# Patient Record
Sex: FEMALE | Race: WHITE | HISPANIC OR LATINO | Employment: UNEMPLOYED | URBAN - METROPOLITAN AREA
[De-identification: names, ages, dates, MRNs, and addresses within clinical notes are randomized per-mention and may not be internally consistent; named-entity substitution may affect disease eponyms.]

---

## 2017-01-17 ENCOUNTER — HOSPITAL ENCOUNTER (OUTPATIENT)
Dept: RADIOLOGY | Facility: HOSPITAL | Age: 77
Discharge: HOME | End: 2017-01-17
Attending: NURSE PRACTITIONER
Payer: MEDICARE

## 2017-01-17 ENCOUNTER — OFFICE VISIT (OUTPATIENT)
Dept: UROGYNECOLOGY | Facility: CLINIC | Age: 77
End: 2017-01-17
Payer: MEDICARE

## 2017-01-17 VITALS
BODY MASS INDEX: 27.66 KG/M2 | HEIGHT: 60 IN | DIASTOLIC BLOOD PRESSURE: 76 MMHG | WEIGHT: 140.88 LBS | SYSTOLIC BLOOD PRESSURE: 140 MMHG

## 2017-01-17 DIAGNOSIS — Z46.89 PESSARY MAINTENANCE: ICD-10-CM

## 2017-01-17 DIAGNOSIS — R19.8 IRREGULAR BOWEL HABITS: ICD-10-CM

## 2017-01-17 DIAGNOSIS — R10.11 RUQ PAIN: ICD-10-CM

## 2017-01-17 DIAGNOSIS — N95.2 VAGINAL ATROPHY: ICD-10-CM

## 2017-01-17 DIAGNOSIS — K76.89 LIVER CYST: ICD-10-CM

## 2017-01-17 DIAGNOSIS — N81.11 MIDLINE CYSTOCELE: ICD-10-CM

## 2017-01-17 DIAGNOSIS — N81.6 RECTOCELE: ICD-10-CM

## 2017-01-17 DIAGNOSIS — N81.4 UTERINE PROLAPSE: Primary | ICD-10-CM

## 2017-01-17 DIAGNOSIS — N81.4 UTEROVAGINAL PROLAPSE: ICD-10-CM

## 2017-01-17 DIAGNOSIS — N39.41 URGE INCONTINENCE: ICD-10-CM

## 2017-01-17 PROCEDURE — 1125F AMNT PAIN NOTED PAIN PRSNT: CPT | Mod: S$GLB,,, | Performed by: NURSE PRACTITIONER

## 2017-01-17 PROCEDURE — 76856 US EXAM PELVIC COMPLETE: CPT | Mod: TC

## 2017-01-17 PROCEDURE — 76830 TRANSVAGINAL US NON-OB: CPT | Mod: 26,,, | Performed by: RADIOLOGY

## 2017-01-17 PROCEDURE — 99999 PR PBB SHADOW E&M-EST. PATIENT-LVL III: CPT | Mod: PBBFAC,,, | Performed by: NURSE PRACTITIONER

## 2017-01-17 PROCEDURE — 1159F MED LIST DOCD IN RCRD: CPT | Mod: S$GLB,,, | Performed by: NURSE PRACTITIONER

## 2017-01-17 PROCEDURE — 76856 US EXAM PELVIC COMPLETE: CPT | Mod: 26,,, | Performed by: RADIOLOGY

## 2017-01-17 PROCEDURE — 1160F RVW MEDS BY RX/DR IN RCRD: CPT | Mod: S$GLB,,, | Performed by: NURSE PRACTITIONER

## 2017-01-17 PROCEDURE — 1157F ADVNC CARE PLAN IN RCRD: CPT | Mod: S$GLB,,, | Performed by: NURSE PRACTITIONER

## 2017-01-17 PROCEDURE — 99213 OFFICE O/P EST LOW 20 MIN: CPT | Mod: S$GLB,,, | Performed by: NURSE PRACTITIONER

## 2017-01-17 NOTE — PATIENT INSTRUCTIONS
1)  Mixed urinary incontinence, urge > stress/prolapse:    --Empty bladder every 3 hours.  Empty well: wait a minute, lean forward on toilet.    --Avoid dietary irritants (see sheet).  Keep diary x 3-5 days to determine your irritants.  --KEGELS: do 10 in AM and 10 in PM, holding each x 10 seconds.  When you feel urge to go, STOP, KEGEL, and when urge has passed, then go to bathroom.  Consider PT in future.  --URGE: consider anticholinergic.   Takes 2-4 weeks to see if will have effect.  For dry mouth: get sour, sugar free lozenge or gum.    --STRESS:  Pessary vs. Sling.   --continue use #4 ring with support pessary      2)Vaginal atrophy (dryness):  Use 1 gram of estrogen cream in vagina twice a week--use small amount with your finger around the opening as well  --trimosan-- use daily    3)Constipation:  Controlling constipation may help bladder urgency/leakage and fiber may better control cholesterol and blood glucose.  Start daily fiber.  Take 1 tsp of fiber powder (psyllium or other sugar-free powder).  Mix in 8 oz of water.  Take x 3-5 days.  Then, increase fiber by 1 tsp every 3-5 days until stool is easy to pass.  Stop and continue at that dose.   Do not exceed 6 tsps/day.  May also use over the counter stool softener 1-2 x/day.  AVOID laxatives.    4)Continue to followup with GI    5)RUQ pain/ liver cyst  --will schedule with hepatology    6)RTC pending ultrasound results

## 2017-01-17 NOTE — MR AVS SNAPSHOT
[facility] - Gynecology  [address]  [facility] [address]  Phone: [phone]                  Linda Felton   2017 1:00 PM   Office Visit    Description:  Female : 1940   Provider:  Madisyn Ramires NP   Department:  [facility] - Gynecology           Reason for Visit     Pessary Check                To Do List           Goals (5 Years of Data)     None      [facility]     [facility] -  Assistance  Registered nurses in the [facility] provide clinical advisement, health education, appointment booking, and other advisory services.  Call for this free service at [phone].             Medications           Message regarding Medications     Verify the changes and/or additions to your medication regime listed below are the same as discussed with your clinician today.  If any of these changes or additions are incorrect, please notify your healthcare provider.             Verify that the below list of medications is an accurate representation of the medications you are currently taking.  If none reported, the list may be blank. If incorrect, please contact your healthcare provider. Carry this list with you in case of emergency.           Current Medications     aspirin-sod bicarb-citric acid (CHANO-SELTZER) 324 mg TbEF Take 325 mg by mouth once daily. Patient takes 1/4 tablet for upset stomach    atenolol (TENORMIN) 50 MG tablet Take by mouth. 1 Tablet Oral Every day    conjugated estrogens (PREMARIN) vaginal cream 1 g with applicator or dime-sized amt with finger in vagina nightly x 2 weeks, then twice a week thereafter    FLUZONE HIGH-DOSE -, PF, 180 mcg/0.5 mL Syrg     olmesartan-hydrochlorothiazide (BENICAR HCT) 40-25 mg per tablet Take 1 tablet by mouth once daily.           Clinical Reference Information           Vital Signs - Last Recorded  Most recent update: 2017  1:34 PM by Raya Orta MA    BP Ht Wt BMI       (!)  140/76 (BP Location: Right arm, Patient Position: Sitting, BP Method: Manual) 5' (1.524 m) 63.9 kg (140 lb 14 oz) 27.51 kg/m2       Blood Pressure          Most Recent Value    BP  (!)  140/76      Allergies as of 1/17/2017     Ivp Dye  [Iodinated Contrast Media - Iv Dye]    Penicillins    Sulfa (Sulfonamide Antibiotics)      Immunizations Administered on Date of Encounter - 1/17/2017     None      Instructions    1)  Mixed urinary incontinence, urge > stress/prolapse:    --Empty bladder every 3 hours.  Empty well: wait a minute, lean forward on toilet.    --Avoid dietary irritants (see sheet).  Keep diary x 3-5 days to determine your irritants.  --KEGELS: do 10 in AM and 10 in PM, holding each x 10 seconds.  When you feel urge to go, STOP, KEGEL, and when urge has passed, then go to bathroom.  Consider PT in future.  --URGE: consider anticholinergic.   Takes 2-4 weeks to see if will have effect.  For dry mouth: get sour, sugar free lozenge or gum.    --STRESS:  Pessary vs. Sling.   --continue use #4 ring with support pessary      2)Vaginal atrophy (dryness):  Use 1 gram of estrogen cream in vagina twice a week--use small amount with your finger around the opening as well  --trimosan-- use daily    3)Constipation:  Controlling constipation may help bladder urgency/leakage and fiber may better control cholesterol and blood glucose.  Start daily fiber.  Take 1 tsp of fiber powder (psyllium or other sugar-free powder).  Mix in 8 oz of water.  Take x 3-5 days.  Then, increase fiber by 1 tsp every 3-5 days until stool is easy to pass.  Stop and continue at that dose.   Do not exceed 6 tsps/day.  May also use over the counter stool softener 1-2 x/day.  AVOID laxatives.    4)Continue to followup with GI    5)RUQ pain/ liver cyst  --will schedule with hepatology    6)RTC pending ultrasound results

## 2017-01-17 NOTE — PROGRESS NOTES
Urogyn follow up  01/17/2017  .  [facility] - GYNECOLOGY  [address]  [place] [address]    Linda Felton  [id number]  1940      Linda Felton is a 77 y.o.  here for a urogyn follow up.    Last HPI from 12/06/2016    HPI:   1)  UI:  (--) ERIKA   (+) UUI    (+) pads:3/day, usually moderate wetness.  Daytime frequency: Q 3 hours.  Nocturia: NO.   (--) dysuria,  (--) hematuria,  (--) frequent UTIs.  (+) complete bladder emptying.   2)  POP:  Present. below introitus.  Symptoms:(--)    (--) vaginal bleeding. (+) vaginal discharge- scant white discharge (--) sexually active.  (--)  Vaginal dryness.  (+) vaginal estrogen use.   3)  BM:  (+) constipation/straining.  (--) chronic diarrhea. (--) hematochezia.  (--) fecal incontinence.  (--) fecal smearing/urgency.  (--) incomplete evacuation.  Taking benefiber 2 tsp twice daily.  4)pessary:  Denies pain or bleeding. Using #4 ring with support.  5)Patient had C. Diff and  + H. Pylori infections - followed by GI  Still has + abdominal pain and loose frequent stools.  Intermittent diarrhea alternating with constipation.    Changes from last visit:  1)  UI:  (--) ERIKA   (+) UUI    (+) pads:3/day, usually moderate wetness.  Daytime frequency: Q 3 hours.  Nocturia: NO.   (--) dysuria,  (--) hematuria,  (--) frequent UTIs.  (+) complete bladder emptying.     2)  POP:  Present. below introitus.  Symptoms:(--)    (--) vaginal bleeding. (+) vaginal discharge- scant white discharge (--) sexually active.  (--)  Vaginal dryness.  (+) vaginal estrogen use.     3)  BM:  (+) constipation/straining.  (--) chronic diarrhea. (--) hematochezia.  (--) fecal incontinence.  (--) fecal smearing/urgency.  (--) incomplete evacuation.  Taking benefiber 2 tsp twice daily.    4)pessary:  Denies pain or bleeding. Using #4 ring with support.    5)Patient had C. Diff and  + H. Pylori infections - followed by GI  Still has + abdominal pain and loose frequent stools.  Intermittent  diarrhea alternating with constipation.    Complains of intermittent RUQ pain.    Past Medical History   Diagnosis Date    Arthritis     Diverticulitis     Hypertension        Past Surgical History   Procedure Laterality Date    Cholecystectomy      Kidney stones removed      Colonoscopy N/A 10/5/2015     Procedure: COLONOSCOPY;  Surgeon: Perez White MD;  Location: [facility] ([address] [place] [address]);  Service: Endoscopy;  Laterality: N/A;  C diff negative per Dr White/see microbiology report dated 8/26/15/svn       Current Outpatient Prescriptions   Medication Sig    aspirin-sod bicarb-citric acid (CHANO-SELTZER) 324 mg TbEF Take 325 mg by mouth once daily. Patient takes 1/4 tablet for upset stomach    atenolol (TENORMIN) 50 MG tablet Take by mouth. 1 Tablet Oral Every day    FLUZONE HIGH-DOSE 2016-17, PF, 180 mcg/0.5 mL Syrg     olmesartan-hydrochlorothiazide (BENICAR HCT) 40-25 mg per tablet Take 1 tablet by mouth once daily.    conjugated estrogens (PREMARIN) vaginal cream Place 0.5 g vaginally twice a week.     No current facility-administered medications for this visit.        Well woman:  Pap:04/2012- normal  Mammo:patient no longer wants to screen  Colonoscopy:  10/2015 diverticulosis, h. Pylori  Dexa:does not want to screen  ROS:  As per HPI.      Exam  Visit Vitals    BP (!) 140/76 (BP Location: Right arm, Patient Position: Sitting, BP Method: Manual)    Ht 5' (1.524 m)    Wt 63.9 kg (140 lb 14 oz)    BMI 27.51 kg/m2     General: alert and oriented, no acute distress  Respiratory: normal respiratory effort  Abd: soft, non-tender, non-distended    Pelvic  Ext. Genitalia: normal external genitalia. Normal bartholin's and skeens glands  Vagina: + atrophy. Normal vaginal mucosa without lesions. No discharge noted.   Non-tender bladder base without palpable mass.  #4 ring with support in place.  Cervix: no lesions  Uterus:  uterus is normal size, shape, consistency and nontender   Urethra: no masses or  tenderness  Urethral meatus: no lesions, caruncle or prolapse.    POP-Q: deferred    Pessary was removed without difficulty.  Washed with soap and water.  Replaced without difficulty.    Impression    1 Irregular bowel habits   2 Rectocele    3 Prolapsed, uterovaginal, complete    4 Atrophic vaginitis    5 Cystocele    6     Urge incontiinence  7.   Pessary maintenance  8.   RUQ pain  9.   Liver cyst    We reviewed the above issues and discussed options for short-term versus long-term management of her problems.   Plan:   1)  Mixed urinary incontinence, urge > stress/prolapse:    --Empty bladder every 3 hours.  Empty well: wait a minute, lean forward on toilet.    --Avoid dietary irritants (see sheet).  Keep diary x 3-5 days to determine your irritants.  --KEGELS: do 10 in AM and 10 in PM, holding each x 10 seconds.  When you feel urge to go, STOP, KEGEL, and when urge has passed, then go to bathroom.  Consider PT in future.  --URGE: consider anticholinergic.   Takes 2-4 weeks to see if will have effect.  For dry mouth: get sour, sugar free lozenge or gum.    --STRESS:  Pessary vs. Sling.   --continue use #4 ring with support pessary      2)Vaginal atrophy (dryness):  Use 1 gram of estrogen cream in vagina twice a week--use small amount with your finger around the opening as well  --trimosan-- use daily    3)Constipation:  Controlling constipation may help bladder urgency/leakage and fiber may better control cholesterol and blood glucose.  Start daily fiber.  Take 1 tsp of fiber powder (psyllium or other sugar-free powder).  Mix in 8 oz of water.  Take x 3-5 days.  Then, increase fiber by 1 tsp every 3-5 days until stool is easy to pass.  Stop and continue at that dose.   Do not exceed 6 tsps/day.  May also use over the counter stool softener 1-2 x/day.  AVOID laxatives.    4)Continue to followup with GI    5)RUQ pain/ liver cyst  --will schedule with hepatology    6)RTC 3 months for pessary cleaning    30 minutes  were spent in face to face time with this patient  75 % of this time was spent in counseling and/or coordination of care    CHEVY Duval-BC  [facility]  Division of Female Pelvic Medicine and Reconstructive Surgery  Department of Obstetrics & Gynecology

## 2017-01-18 DIAGNOSIS — N95.2 ATROPHIC VAGINITIS: ICD-10-CM

## 2017-01-31 ENCOUNTER — DOCUMENTATION ONLY (OUTPATIENT)
Dept: TRANSPLANT | Facility: CLINIC | Age: 77
End: 2017-01-31

## 2017-01-31 ENCOUNTER — TELEPHONE (OUTPATIENT)
Dept: TRANSPLANT | Facility: CLINIC | Age: 77
End: 2017-01-31

## 2017-01-31 NOTE — LETTER
January 31, 2017    Madisyn Ramires NP  [address] [facility] [address] [place] [address]      Dear Dr. Ramires    Patient: Linda Felton   MR Number: [id number]   YOB: 1940     Thank you for the referral of Linda Felton to the [facility]. An initial appointment will be scheduled for your patient with one of our Hepatologists.      Thank you again for your trust in our program.  If there is anything we can do for you or your staff, please feel free to contact us.        Sincerely,        [facility]  [address] [place] [id number]  [phone]

## 2017-01-31 NOTE — LETTER
January 31, 2017    Mrs. Linda Felton  [address] [address]      Dear Mrs. Linda Felton:    Your doctor has referred you to the [facility]. You will be contacted by our office and an initial appointment will then be scheduled for you.    We look forward to seeing you soon. If you have any further questions, please contact us at [phone].       Sincerely,        [facility]   [address] [place] [address]  [phone]

## 2017-01-31 NOTE — TELEPHONE ENCOUNTER
----- Message from Rossi St Javed sent at 1/31/2017 10:41 AM CST -----  Est patient of Dr. Curtis and she wants to wait to see Dr. Curtis.  She does not want to see a NP.  ----- Message -----     From: Viky La     Sent: 1/31/2017   9:40 AM       To: Hepatology Scheduling    Pt records reviewed.   Pt will be referred to Hepatology.    [facility]   Referring Provider/diagnosis  Madisyn Ramires NP   Diagnosis: Liver cyst  RUQ pain    PT was seen in 2016 for same cyst, being referred again.    Referral letter sent to provider and patient.

## 2017-01-31 NOTE — NURSING
Pt records reviewed.   Pt will be referred to Hepatology.    Initial referral received  from the [facility].   Referring Provider/diagnosis  Madisyn Ramires NP    Diagnosis: Liver cyst  RUQ pain     PT was seen in 2016 for same cyst, being referred again.    Referral letter sent to provider and patient.

## 2017-02-03 ENCOUNTER — TELEPHONE (OUTPATIENT)
Dept: HEPATOLOGY | Facility: CLINIC | Age: 77
End: 2017-02-03

## 2017-02-03 NOTE — TELEPHONE ENCOUNTER
----- Message from Roselia Singer sent at 2/2/2017 12:59 PM CST -----  Contact: Self  Good afternoon,     Pt is requesting an appt due to check up.    Pt can be reached at [phone].    Thank you!

## 2017-02-16 ENCOUNTER — DOCUMENTATION ONLY (OUTPATIENT)
Dept: TRANSPLANT | Facility: CLINIC | Age: 77
End: 2017-02-16

## 2017-02-16 DIAGNOSIS — R93.89 THICKENED ENDOMETRIUM: Primary | ICD-10-CM

## 2017-02-20 DIAGNOSIS — R93.89 THICKENED ENDOMETRIUM: Primary | ICD-10-CM

## 2017-02-21 ENCOUNTER — OFFICE VISIT (OUTPATIENT)
Dept: HEPATOLOGY | Facility: CLINIC | Age: 77
End: 2017-02-21
Payer: MEDICARE

## 2017-02-21 ENCOUNTER — TELEPHONE (OUTPATIENT)
Dept: UROGYNECOLOGY | Facility: CLINIC | Age: 77
End: 2017-02-21

## 2017-02-21 VITALS
OXYGEN SATURATION: 98 % | WEIGHT: 139.56 LBS | HEIGHT: 64 IN | SYSTOLIC BLOOD PRESSURE: 146 MMHG | HEART RATE: 62 BPM | BODY MASS INDEX: 23.82 KG/M2 | TEMPERATURE: 98 F | DIASTOLIC BLOOD PRESSURE: 61 MMHG | RESPIRATION RATE: 20 BRPM

## 2017-02-21 DIAGNOSIS — K76.89 LIVER CYST: Primary | ICD-10-CM

## 2017-02-21 PROCEDURE — 99999 PR PBB SHADOW E&M-EST. PATIENT-LVL III: CPT | Mod: PBBFAC,,, | Performed by: INTERNAL MEDICINE

## 2017-02-21 PROCEDURE — 1157F ADVNC CARE PLAN IN RCRD: CPT | Mod: S$GLB,,, | Performed by: INTERNAL MEDICINE

## 2017-02-21 PROCEDURE — 1125F AMNT PAIN NOTED PAIN PRSNT: CPT | Mod: S$GLB,,, | Performed by: INTERNAL MEDICINE

## 2017-02-21 PROCEDURE — 99213 OFFICE O/P EST LOW 20 MIN: CPT | Mod: S$GLB,,, | Performed by: INTERNAL MEDICINE

## 2017-02-21 PROCEDURE — 1160F RVW MEDS BY RX/DR IN RCRD: CPT | Mod: S$GLB,,, | Performed by: INTERNAL MEDICINE

## 2017-02-21 PROCEDURE — 1159F MED LIST DOCD IN RCRD: CPT | Mod: S$GLB,,, | Performed by: INTERNAL MEDICINE

## 2017-02-21 RX ORDER — HYDROCHLOROTHIAZIDE 25 MG/1
25 TABLET ORAL DAILY
Refills: 0 | COMMUNITY
Start: 2017-01-18

## 2017-02-21 RX ORDER — AMLODIPINE BESYLATE 10 MG/1
10 TABLET ORAL DAILY
Refills: 0 | COMMUNITY
Start: 2017-01-18 | End: 2017-09-07 | Stop reason: DRUGHIGH

## 2017-02-21 NOTE — PROGRESS NOTES
[facility] Follow-up Note    Reason for Consult:  The encounter diagnosis was Liver cyst.    PCP: Bhargav Fernandez       HPI:  This is a 77 y.o. female here for evaluation of: liver cyst    Liver cyst seen incidentally on CT renal stone study on 7/6/12 (3.5 yrs ago).  Ultrasound of abd 4/2016 showed a larger cyst 9.4 x 7.2 x 8.1 cm is consistent with a simple cyst.    Noncontrast CT renal stone study from 2012:  The noncontrast appearance of the liver demonstrates a low attenuating lesion at the tip of the right lobe measuring 5.4 x 6 .0 x 6.9 cm, could represent a liver cyst, this could be confirmed with ultrasound.      Ultrasound of abd 4/2016 showed a larger cyst 9.4 x 7.2 x 8.1 cm is consistent with a simple cyst.    Occasional pain over the RUQ, wears a support belt across the upper abdomen while using treadmill during exercise.  Dr. Fernandez, PCP, did another u/s in his office in [place], and it was the same size as previous u/s study. No bleeding in the cyst.          ROS:  Constitutional: No fevers, chills, weight changes, fatigue  ENT: No allergies, nosebleeds,   CV: No chest pain  Pulm: No cough, shortness of breath  Ophtho: No vision changes  GI/Liver: see HPI, has reflux (acid), burning retrosternal pain.    Derm: No rash, itching  Heme: No swollen glands, bruising  MSK: No joint pains, joint swelling  : No dysuria, hematuria, decrease in urine output  Endo: No hot or cold intolerance  Neuro: No confusion, disorientation, difficulty with sleep, memory, concentration, syncope, seizure  Psych: No anxiety, depression    Medical History:  has a past medical history of Arthritis; Diverticulitis; and Hypertension.    Surgical History:  has a past surgical history that includes Cholecystectomy; kidney stones removed; and Colonoscopy (N/A, 10/5/2015).    Family History: family history includes Breast cancer in her maternal aunt; Colon cancer (age of onset: 60) in her sister; Hypertension in  "her mother; Seizures in her paternal uncle. There is no history of Ovarian cancer, Endometrial cancer, Vaginal cancer, Cervical cancer, Celiac disease, Crohn's disease, Esophageal cancer, Inflammatory bowel disease, Liver cancer, Liver disease, Rectal cancer, Stomach cancer, Ulcerative colitis, or Cirrhosis..     Social History:  reports that she has never smoked. She has never used smokeless tobacco. She reports that she does not drink alcohol or use illicit drugs.    Review of patient's allergies indicates:   Allergen Reactions    Ivp dye  [iodinated contrast media - iv dye]      Other reaction(s): Rash    Penicillins      Other reaction(s): Rash    Sulfa (sulfonamide antibiotics)      Other reaction(s): Rash       Current Outpatient Rx   Name  Route  Sig  Dispense  Refill    atenolol (TENORMIN) 50 MG tablet    Oral    Take by mouth. 1 Tablet Oral Every day              conjugated estrogens (PREMARIN) vaginal cream        1 g with applicator or dime-sized amt with finger in vagina nightly x 2 weeks, then twice a week thereafter    30 g    12      olmesartan-hydrochlorothiazide (BENICAR HCT) 40-25 mg per tablet    Oral    Take 1 tablet by mouth once daily.              DISCONTD: esomeprazole (NEXIUM) 40 MG capsule    Oral    Take 1 capsule (40 mg total) by mouth before breakfast.    90 capsule    3      DISCONTD: hydrochlorothiazide (HYDRODIURIL) 25 MG tablet    Oral    Take 25 mg by mouth once daily.        0         Objective Findings:    Vital Signs:  Visit Vitals    BP (!) 146/61 (BP Location: Right arm, Patient Position: Sitting, BP Method: Automatic)    Pulse 62    Temp 97.8 °F (36.6 °C) (Oral)    Resp 20    Ht 5' 4" (1.626 m)    Wt 63.3 kg (139 lb 8.8 oz)    SpO2 98%    BMI 23.95 kg/m2     Body mass index is 23.95 kg/(m^2).    Physical Exam:  General Appearance: Well appearing in no acute distress  Head:   Normocephalic, without obvious abnormality  Eyes:    No scleral icterus, EOMI  ENT: " Neck supple, Lips, mucosa, and tongue normal; teeth and gums normal  Lungs: CTA bilaterally in anterior and posterior fields, no wheezes, no crackles.  Heart:  Regular rate and rhythm, S1, S2 normal, no murmurs heard  Abdomen: Soft, non tender, non distended with positive bowel sounds in all four quadrants. No hepatosplenomegaly, ascites, or mass  Extremities: 2+ pulses, no clubbing, cyanosis or edema  Skin: No rash  Neurologic: CN II-XII intact, alert, oriented x 3. No asterixis.      Labs:  Lab Results   Component Value Date    WBC 5.25 04/10/2015    HGB 13.3 04/10/2015    HCT 39.8 04/10/2015     04/10/2015    CREATININE 1.1 07/14/2015    BUN 23 07/14/2015    BILITOT 0.9 05/05/2015    ALT 12 05/05/2015    AST 17 05/05/2015    ALKPHOS 74 05/05/2015     07/14/2015    K 3.3 (L) 07/14/2015     07/14/2015    CO2 29 07/14/2015    TSH 0.760 04/10/2015       Imaging:   As above. Old noncon CT  U/s 4/2016    Endoscopy:    EGD, colonoscopy    Assessment:  1. Liver cyst       Incidental findings on non-contrast CT renal stone study showed:  low attenuating lesion at the tip of the right lobe measuring 5.4 x 6 .0 x 6.9 cm, could represent a liver cyst, this was confirmed with ultrasound.   Follow-up ultrasound 4/13/16: The cystic mass measures 9.4 x 7.2 x 8.1 cm is consistent with a simple cyst.    -  GERD symptoms, patient not taking PPI because she saw on TV it is bad for you.      Recommendations:  -  Ultrasound abd to evaluate liver cyst in 1 year.   -  Return in 1 year.     Return if symptoms worsen or fail to improve.      Order summary:  Orders Placed This Encounter   Procedures    US Abdomen Limited       Thank you so much for allowing me to participate in the care of Linda Curtis MD

## 2017-02-21 NOTE — TELEPHONE ENCOUNTER
----- Message from Madisyn Ramires NP sent at 2/20/2017  4:37 PM CST -----  Please schedule patient for endometrial biopsy.  I have placed the orders.      Thanks,  Madisyn

## 2017-02-21 NOTE — TELEPHONE ENCOUNTER
Spoke to pt and scheduled her EMBX on 3/14 and informed the pt i'd send an appointment letter in the mail. Pt voiced understanding and call ended.

## 2017-02-22 ENCOUNTER — TELEPHONE (OUTPATIENT)
Dept: HEPATOLOGY | Facility: CLINIC | Age: 77
End: 2017-02-22

## 2017-03-14 ENCOUNTER — PROCEDURE VISIT (OUTPATIENT)
Dept: UROGYNECOLOGY | Facility: CLINIC | Age: 77
End: 2017-03-14
Payer: MEDICARE

## 2017-03-14 VITALS — SYSTOLIC BLOOD PRESSURE: 122 MMHG | DIASTOLIC BLOOD PRESSURE: 70 MMHG | HEIGHT: 63 IN

## 2017-03-14 DIAGNOSIS — R93.89 THICKENED ENDOMETRIUM: Primary | ICD-10-CM

## 2017-03-14 PROCEDURE — 88305 TISSUE EXAM BY PATHOLOGIST: CPT | Mod: 26,,,

## 2017-03-14 PROCEDURE — 88305 TISSUE EXAM BY PATHOLOGIST: CPT

## 2017-03-14 PROCEDURE — 58100 BIOPSY OF UTERUS LINING: CPT | Mod: S$GLB,,, | Performed by: NURSE PRACTITIONER

## 2017-03-14 NOTE — MR AVS SNAPSHOT
[facility] - Gynecology  [address]  [facility] [address]  Phone: [phone]                  Linda Felton   3/14/2017 1:30 PM   Procedure visit    Description:  Female : 1940   Provider:  Madisyn Ramires NP   Department:  [facility] - Gynecology           Reason for Visit     Endometrial Biopsy           Diagnoses this Visit        Comments    Thickened endometrium    -  Primary            To Do List           Goals (5 Years of Data)     None      [facility]     [facility] Care [facility] -  Assistance  Registered nurses in the [facility] provide clinical advisement, health education, appointment booking, and other advisory services.  Call for this free service at [phone].             Medications           Message regarding Medications     Verify the changes and/or additions to your medication regime listed below are the same as discussed with your clinician today.  If any of these changes or additions are incorrect, please notify your healthcare provider.        STOP taking these medications     FLUZONE HIGH-DOSE , PF, 180 mcg/0.5 mL Syrg            Verify that the below list of medications is an accurate representation of the medications you are currently taking.  If none reported, the list may be blank. If incorrect, please contact your healthcare provider. Carry this list with you in case of emergency.           Current Medications     amlodipine (NORVASC) 10 MG tablet Take 10 mg by mouth once daily.    aspirin-sod bicarb-citric acid (CHANO-SELTZER) 324 mg TbEF Take 325 mg by mouth once daily. Patient takes 1/4 tablet for upset stomach    atenolol (TENORMIN) 50 MG tablet Take by mouth. 1 Tablet Oral Every day    conjugated estrogens (PREMARIN) vaginal cream Place 0.5 g vaginally twice a week.    hydrochlorothiazide (HYDRODIURIL) 25 MG tablet Take 25 mg by mouth once daily.           Clinical Reference Information           Your Vitals  "Were     BP Height                122/70 5' 3" (1.6 m)          Blood Pressure          Most Recent Value    BP  122/70      Allergies as of 3/14/2017     Ivp Dye  [Iodinated Contrast Media - Iv Dye]    Penicillins    Sulfa (Sulfonamide Antibiotics)      Immunizations Administered on Date of Encounter - 3/14/2017     None      Orders Placed During Today's Visit      Normal Orders This Visit    Biopsy (Gynecological)       Instructions    1) Mixed urinary incontinence, urge > stress/prolapse:   --Empty bladder every 3 hours. Empty well: wait a minute, lean forward on toilet.   --Avoid dietary irritants (see sheet). Keep diary x 3-5 days to determine your irritants.  --KEGELS: do 10 in AM and 10 in PM, holding each x 10 seconds. When you feel urge to go, STOP, KEGEL, and when urge has passed, then go to bathroom. Consider PT in future. --URGE: consider anticholinergic. Takes 2-4 weeks to see if will have effect. For dry mouth: get sour, sugar free lozenge or gum.   --STRESS: Pessary vs. Sling.   --continue use #4 ring with support pessary        2)Vaginal atrophy (dryness): Use 1 gram of estrogen cream in vagina twice a week--use small amount with your finger around the opening as well  --trimosan-- use daily     3)Constipation:  Controlling constipation may help bladder urgency/leakage and fiber may better control cholesterol and blood glucose. Start daily fiber. Take 1 tsp of fiber powder (psyllium or other sugar-free powder). Mix in 8 oz of water. Take x 3-5 days. Then, increase fiber by 1 tsp every 3-5 days until stool is easy to pass. Stop and continue at that dose. Do not exceed 6 tsps/day. May also use over the counter stool softener 1-2 x/day. AVOID laxatives.     4)Continue to followup with GI     5)RUQ pain/ liver cyst  --will schedule with hepatology    6)thickened endometrium  --embx done today  --takes 2 weeks for results.     7)RTC 3 months for pessary cleaning or sooner for surgery       Language " Assistance Services     ATTENTION: Language assistance services are available, free of charge. Please call [phone].      ATENCIÓN: Si habla louañol, tiene a boston disposición servicios gratuitos de asistencia lingüística. Llame al [phone].     CHÚ Ý: N?u b?n nói Ti?ng Vi?t, có các d?ch v? h? tr? ngôn ng? mi?n phí dành cho b?n. G?i s? [phone].         [facility] complies with applicable Federal civil rights laws and does not discriminate on the basis of race, color, national origin, age, disability, or sex.

## 2017-03-14 NOTE — PATIENT INSTRUCTIONS
1) Mixed urinary incontinence, urge > stress/prolapse:   --Empty bladder every 3 hours. Empty well: wait a minute, lean forward on toilet.   --Avoid dietary irritants (see sheet). Keep diary x 3-5 days to determine your irritants.  --KEGELS: do 10 in AM and 10 in PM, holding each x 10 seconds. When you feel urge to go, STOP, KEGEL, and when urge has passed, then go to bathroom. Consider PT in future. --URGE: consider anticholinergic. Takes 2-4 weeks to see if will have effect. For dry mouth: get sour, sugar free lozenge or gum.   --STRESS: Pessary vs. Sling.   --continue use #4 ring with support pessary        2)Vaginal atrophy (dryness): Use 1 gram of estrogen cream in vagina twice a week--use small amount with your finger around the opening as well  --trimosan-- use daily     3)Constipation:  Controlling constipation may help bladder urgency/leakage and fiber may better control cholesterol and blood glucose. Start daily fiber. Take 1 tsp of fiber powder (psyllium or other sugar-free powder). Mix in 8 oz of water. Take x 3-5 days. Then, increase fiber by 1 tsp every 3-5 days until stool is easy to pass. Stop and continue at that dose. Do not exceed 6 tsps/day. May also use over the counter stool softener 1-2 x/day. AVOID laxatives.     4)Continue to followup with GI     5)RUQ pain/ liver cyst  --will schedule with hepatology    6)thickened endometrium  --embx done today  --takes 2 weeks for results.     7)RTC 3 months for pessary cleaning or sooner for surgery

## 2017-03-14 NOTE — PROCEDURES
Biopsy (Gynecological)  Date/Time: 3/14/2017 2:12 PM  Performed by: ORLY OCAMPO  Authorized by: ORLY OCAMPO     Consent Done?:  Yes (Verbal)  Local anesthesia used?: No      Biopsy Location:  Uterus  Estimated blood loss (cc):  0   Patient tolerated the procedure well with no immediate complications.     PROCEDURE (EMBx):  The patient was placed in dorsal lithotomy position. The pessary was removed.  Washed with soap and water.  A sterile speculum was used to identify the cervix. The ectocervix was prepped x 2 with betadine.  A single tooth tenaculum was used to grasp the posterior cervix.  The endometrial biopsy pipelle was advanced into the uterine cavity until gentle resistance was met.  2 passes were made, and the specimen was submitted to pathology for further evaluation.  The tenaculum was removed, and the site remained hemostatic.  The speculum was removed.  The patient tolerated the procedure well. Pessary was reinserted without difficulty.  KVNG Duval

## 2017-03-17 ENCOUNTER — PATIENT MESSAGE (OUTPATIENT)
Dept: UROGYNECOLOGY | Facility: CLINIC | Age: 77
End: 2017-03-17

## 2017-03-22 DIAGNOSIS — N95.2 ATROPHIC VAGINITIS: ICD-10-CM

## 2017-03-23 RX ORDER — CONJUGATED ESTROGENS 0.62 MG/G
CREAM VAGINAL
Qty: 30 G | Refills: 3 | Status: SHIPPED | OUTPATIENT
Start: 2017-03-23 | End: 2018-07-05 | Stop reason: SDUPTHER

## 2017-07-19 ENCOUNTER — TELEPHONE (OUTPATIENT)
Dept: GASTROENTEROLOGY | Facility: CLINIC | Age: 77
End: 2017-07-19

## 2017-07-19 NOTE — TELEPHONE ENCOUNTER
----- Message from Qiana Marge sent at 7/19/2017 10:49 AM CDT -----  Contact: self [phone]  rosa - diarrhea and cramps - is asking for appt with dr lee - please call patient at [phone]

## 2017-08-01 ENCOUNTER — OFFICE VISIT (OUTPATIENT)
Dept: UROGYNECOLOGY | Facility: CLINIC | Age: 77
End: 2017-08-01
Payer: MEDICARE

## 2017-08-01 VITALS — HEIGHT: 61 IN | BODY MASS INDEX: 24.64 KG/M2 | WEIGHT: 130.5 LBS

## 2017-08-01 DIAGNOSIS — N81.6 RECTOCELE: ICD-10-CM

## 2017-08-01 DIAGNOSIS — N81.11 MIDLINE CYSTOCELE: ICD-10-CM

## 2017-08-01 DIAGNOSIS — N99.3 VAGINAL VAULT PROLAPSE AFTER HYSTERECTOMY: Primary | ICD-10-CM

## 2017-08-01 DIAGNOSIS — Z46.89 PESSARY MAINTENANCE: ICD-10-CM

## 2017-08-01 DIAGNOSIS — N39.46 MIXED STRESS AND URGE URINARY INCONTINENCE: ICD-10-CM

## 2017-08-01 DIAGNOSIS — N95.2 VAGINAL ATROPHY: ICD-10-CM

## 2017-08-01 LAB
BILIRUB SERPL-MCNC: NORMAL MG/DL
BLOOD URINE, POC: NORMAL
COLOR, POC UA: YELLOW
GLUCOSE UR QL STRIP: NORMAL
KETONES UR QL STRIP: NORMAL
LEUKOCYTE ESTERASE URINE, POC: NORMAL
NITRITE, POC UA: NORMAL
PH, POC UA: 5
PROTEIN, POC: NORMAL
SPECIFIC GRAVITY, POC UA: 1.01
UROBILINOGEN, POC UA: NORMAL

## 2017-08-01 PROCEDURE — 99499 UNLISTED E&M SERVICE: CPT | Mod: S$GLB,,, | Performed by: NURSE PRACTITIONER

## 2017-08-01 PROCEDURE — 81002 URINALYSIS NONAUTO W/O SCOPE: CPT | Mod: S$GLB,,, | Performed by: NURSE PRACTITIONER

## 2017-08-01 PROCEDURE — 99999 PR PBB SHADOW E&M-EST. PATIENT-LVL III: CPT | Mod: PBBFAC,,, | Performed by: NURSE PRACTITIONER

## 2017-08-01 RX ORDER — AMLODIPINE BESYLATE 2.5 MG/1
2.5 TABLET ORAL DAILY
Refills: 0 | COMMUNITY
Start: 2017-06-28 | End: 2020-10-06

## 2017-08-01 RX ORDER — PANCRELIPASE 24000; 76000; 120000 [USP'U]/1; [USP'U]/1; [USP'U]/1
CAPSULE, DELAYED RELEASE PELLETS ORAL
COMMUNITY
Start: 2017-07-26 | End: 2018-11-27

## 2017-08-01 NOTE — PROGRESS NOTES
Urogyn follow up  08/01/2017  .  [facility] - GYNECOLOGY  [address]  [place] [address]    Linda Felton  [id number]  1940      Linda Felton is a 77 y.o.  here for a urogyn follow up.    Last HPI from 01/17/2017    HPI:   1)  UI:  (--) ERIKA   (+) UUI    (+) pads:3/day, usually moderate wetness.  Daytime frequency: Q 3 hours.  Nocturia: NO.   (--) dysuria,  (--) hematuria,  (--) frequent UTIs.  (+) complete bladder emptying.   2)  POP:  Present. below introitus.  Symptoms:(--)    (--) vaginal bleeding. (+) vaginal discharge- scant white discharge (--) sexually active.  (--)  Vaginal dryness.  (+) vaginal estrogen use.   3)  BM:  (+) constipation/straining.  (--) chronic diarrhea. (--) hematochezia.  (--) fecal incontinence.  (--) fecal smearing/urgency.  (--) incomplete evacuation.  Taking benefiber 2 tsp twice daily.  4)pessary:  Denies pain or bleeding. Using #4 ring with support.  5)Patient had C. Diff and  + H. Pylori infections - followed by GI  Still has + abdominal pain and loose frequent stools.  Intermittent diarrhea alternating with constipation.  Complains of intermittent RUQ pain.    Changes from last visit:  1)  UI:  (--) ERIKA   (+) UUI    (+) pads:3/day, usually moderate wetness.  Daytime frequency: Q 3 hours.  Nocturia: NO.   (--) dysuria,  (--) hematuria,  (--) frequent UTIs.  (+) complete bladder emptying.     2)  POP:  Present. below introitus.  Symptoms:(--)    (--) vaginal bleeding. (+) vaginal discharge- scant white discharge (--) sexually active.  (--)  Vaginal dryness.  (+) vaginal estrogen use.     3)  BM:  (+) constipation/straining.  (--) chronic diarrhea. (--) hematochezia.  (--) fecal incontinence.  (--) fecal smearing/urgency.  (--) incomplete evacuation.  Taking benefiber 2 tsp twice daily.    4)pessary:  Denies pain or bleeding. Using #4 ring with support.    5)Patient had C. Diff and  + H. Pylori infections - followed by GI  Still has + abdominal pain and  loose frequent stools.  Intermittent diarrhea alternating with constipation.  Will start creon today.    12/2016  The uterus measures 7.1 x 3.3 x 3.8 cm. Uterine parenchyma is homogeneous with evidence of a few uterine fibroids.  One appears partially calcified and measures 0.7 x 0.6 x 0.3 cm.  The largest is in the myometrium of the uterine fundus measuring 1.6 x 1.6 x 1.6 cm.  Additionally there is a new fibroid anteriorly measuring 0.9 x 0.7 x 0.9 cm. The endometrial stripe is thickened and measures 0.7 cm.    The right ovary is normal in size and measures 1.1 1.0 x 1.2 cm. The left ovary is normal in size and measures 1.1 x 0.4 x 1.2 cm. Follicles are seen in both ovaries. Arterial and venous flow are preserved bilaterally with resistive indices of 0.7 on the right and 0.75 on the left. No free fluid is seen.   Impression     The uterus demonstrates presence of a few fibroids, 2 of which are stable in appearance, and 1 new fibroid.  There is endometrial thickening, underlying pathology cannot be excluded.  Consider further evaluation.  This report has been flagged in the [facility] medical record.     03/14/2017  embx  ATROPHIC ENDOMETRIUM.  NO HYPERPLASIA OR MALIGNANCY IDENTIFIED    Past Medical History:   Diagnosis Date    Arthritis     Diverticulitis     Hypertension        Past Surgical History:   Procedure Laterality Date    CHOLECYSTECTOMY      COLONOSCOPY N/A 10/5/2015    Procedure: COLONOSCOPY;  Surgeon: Perez White MD;  Location: [address]);  Service: Endoscopy;  Laterality: N/A;  C diff negative per Dr White/see microbiology report dated 8/26/15/svn    kidney stones removed         Current Outpatient Prescriptions   Medication Sig    amlodipine (NORVASC) 10 MG tablet Take 10 mg by mouth once daily.    amlodipine (NORVASC) 2.5 MG tablet Take 2.5 mg by mouth once daily.    aspirin-sod bicarb-citric acid (CHANO-SELTZER) 324 mg TbEF Take 325 mg by mouth once daily. Patient takes 1/4  "tablet for upset stomach    atenolol (TENORMIN) 50 MG tablet Take by mouth. 1 Tablet Oral Every day    conjugated estrogens (PREMARIN) vaginal cream Place 0.5 g vaginally twice a week.    CREON 24,000-76,000 -120,000 unit capsule     hydrochlorothiazide (HYDRODIURIL) 25 MG tablet Take 25 mg by mouth once daily.    PREMARIN vaginal cream INSERT 1 APPLICATORFUL/DIME-SIZED AMOUNT WITH FINGER INTO VAGINA NIGHTLY X2 WEEKS,THEN TWICE WEEKLY     No current facility-administered medications for this visit.        Well woman:  Pap:04/2012- normal  Mammo:patient no longer wants to screen  Colonoscopy:  10/2015 diverticulosis, h. Pylori  Dexa:does not want to screen  ROS:  As per HPI.      Exam  Ht 5' 1" (1.549 m)   Wt 59.2 kg (130 lb 8.2 oz)   BMI 24.66 kg/m²   General: alert and oriented, no acute distress  Respiratory: normal respiratory effort  Abd: soft, non-tender, non-distended    Pelvic  Ext. Genitalia: normal external genitalia. Normal bartholin's and skeens glands  Vagina: + atrophy. Normal vaginal mucosa without lesions. No discharge noted.   Non-tender bladder base without palpable mass.  #4 ring with support in place.  Cervix: no lesions  Uterus:  uterus is normal size, shape, consistency and nontender   Urethra: no masses or tenderness  Urethral meatus: no lesions, caruncle or prolapse.    POP-Q: deferred    Pessary was removed without difficulty.  Washed with soap and water.  Replaced without difficulty.    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 Midline cystocele     3. Rectocele     4. Mixed stress and urge urinary incontinence  POCT URINE DIPSTICK WITHOUT MICROSCOPE   5. Vaginal atrophy     6. Pessary maintenance           Plan:   1)  Mixed urinary incontinence, urge > stress/prolapse:    --Empty bladder every 3 hours.  Empty well: wait a minute, lean forward on toilet.    --Avoid dietary irritants (see sheet).  Keep diary x 3-5 days to determine your irritants.  --KEGELS: do 10 in AM and " 10 in PM, holding each x 10 seconds.  When you feel urge to go, STOP, KEGEL, and when urge has passed, then go to bathroom.  Consider PT in future.  --URGE: consider anticholinergic.   Takes 2-4 weeks to see if will have effect.  For dry mouth: get sour, sugar free lozenge or gum.    --STRESS:  Pessary vs. Sling.   --continue use #4 ring with support pessary      2)Vaginal atrophy (dryness):  Use 1 gram of estrogen cream in vagina twice a week--use small amount with your finger around the opening as well  --trimosan-- use daily    3)Constipation:  Controlling constipation may help bladder urgency/leakage and fiber may better control cholesterol and blood glucose.  Start daily fiber.  Take 1 tsp of fiber powder (psyllium or other sugar-free powder).  Mix in 8 oz of water.  Take x 3-5 days.  Then, increase fiber by 1 tsp every 3-5 days until stool is easy to pass.  Stop and continue at that dose.   Do not exceed 6 tsps/day.  May also use over the counter stool softener 1-2 x/day.  AVOID laxatives.    4)Continue to followup with GI    5)RUQ pain/ liver cyst  --followed by hepatology    6)RTC to discuss surgery with Dr. Gates      30 minutes were spent in face to face time with this patient  75 % of this time was spent in counseling and/or coordination of care    CHEVY Duval-BC [facility]  Division of Female Pelvic Medicine and Reconstructive Surgery  Department of Obstetrics & Gynecology

## 2017-08-01 NOTE — PATIENT INSTRUCTIONS
1)  Mixed urinary incontinence, urge > stress/prolapse:    --Empty bladder every 3 hours.  Empty well: wait a minute, lean forward on toilet.    --Avoid dietary irritants (see sheet).  Keep diary x 3-5 days to determine your irritants.  --KEGELS: do 10 in AM and 10 in PM, holding each x 10 seconds.  When you feel urge to go, STOP, KEGEL, and when urge has passed, then go to bathroom.  Consider PT in future.  --URGE: consider anticholinergic.   Takes 2-4 weeks to see if will have effect.  For dry mouth: get sour, sugar free lozenge or gum.    --STRESS:  Pessary vs. Sling.   --continue use #4 ring with support pessary      2)Vaginal atrophy (dryness):  Use 1 gram of estrogen cream in vagina twice a week--use small amount with your finger around the opening as well  --trimosan-- use daily    3)Constipation:  Controlling constipation may help bladder urgency/leakage and fiber may better control cholesterol and blood glucose.  Start daily fiber.  Take 1 tsp of fiber powder (psyllium or other sugar-free powder).  Mix in 8 oz of water.  Take x 3-5 days.  Then, increase fiber by 1 tsp every 3-5 days until stool is easy to pass.  Stop and continue at that dose.   Do not exceed 6 tsps/day.  May also use over the counter stool softener 1-2 x/day.  AVOID laxatives.    4)Continue to followup with GI    5)RUQ pain/ liver cyst  --followed by hepatology    6)RTC to discuss surgery with Dr. Gates

## 2017-08-08 ENCOUNTER — OFFICE VISIT (OUTPATIENT)
Dept: UROGYNECOLOGY | Facility: CLINIC | Age: 77
End: 2017-08-08
Payer: MEDICARE

## 2017-08-08 VITALS
BODY MASS INDEX: 23.93 KG/M2 | SYSTOLIC BLOOD PRESSURE: 132 MMHG | HEIGHT: 62 IN | DIASTOLIC BLOOD PRESSURE: 84 MMHG | WEIGHT: 130.06 LBS

## 2017-08-08 DIAGNOSIS — N81.3 PROLAPSED, UTEROVAGINAL, COMPLETE: ICD-10-CM

## 2017-08-08 DIAGNOSIS — N39.46 URINARY INCONTINENCE, MIXED: Primary | ICD-10-CM

## 2017-08-08 DIAGNOSIS — R39.11 URINARY HESITANCY: ICD-10-CM

## 2017-08-08 DIAGNOSIS — K76.89 LIVER CYST: ICD-10-CM

## 2017-08-08 DIAGNOSIS — R19.7 DIARRHEA, UNSPECIFIED TYPE: ICD-10-CM

## 2017-08-08 DIAGNOSIS — N81.6 RECTOCELE: ICD-10-CM

## 2017-08-08 DIAGNOSIS — K59.09 CHRONIC CONSTIPATION: ICD-10-CM

## 2017-08-08 DIAGNOSIS — R39.15 URINARY URGENCY: ICD-10-CM

## 2017-08-08 DIAGNOSIS — N81.11 MIDLINE CYSTOCELE: ICD-10-CM

## 2017-08-08 DIAGNOSIS — N95.2 ATROPHIC VAGINITIS: ICD-10-CM

## 2017-08-08 DIAGNOSIS — N39.0 FREQUENT UTI: ICD-10-CM

## 2017-08-08 DIAGNOSIS — R19.5 LOOSE STOOLS: ICD-10-CM

## 2017-08-08 DIAGNOSIS — N36.41 URETHRAL HYPERMOBILITY: ICD-10-CM

## 2017-08-08 PROCEDURE — 99213 OFFICE O/P EST LOW 20 MIN: CPT | Mod: S$GLB,,, | Performed by: OBSTETRICS & GYNECOLOGY

## 2017-08-08 PROCEDURE — 99999 PR PBB SHADOW E&M-EST. PATIENT-LVL III: CPT | Mod: PBBFAC,,, | Performed by: OBSTETRICS & GYNECOLOGY

## 2017-08-08 PROCEDURE — 3008F BODY MASS INDEX DOCD: CPT | Mod: S$GLB,,, | Performed by: OBSTETRICS & GYNECOLOGY

## 2017-08-08 PROCEDURE — 1126F AMNT PAIN NOTED NONE PRSNT: CPT | Mod: S$GLB,,, | Performed by: OBSTETRICS & GYNECOLOGY

## 2017-08-08 PROCEDURE — 1159F MED LIST DOCD IN RCRD: CPT | Mod: S$GLB,,, | Performed by: OBSTETRICS & GYNECOLOGY

## 2017-08-08 NOTE — PROGRESS NOTES
Urogyn follow up  08/01/2017    [facility] - GYNECOLOGY  [address]  [facility] [id number]    Linda Felton  [id number]  1940      Linda Felton is a 77 y.o.  here for a urogyn follow up.    Last HPI from 01/17/2017    HPI:   1)  UI:  (--) ERIKA   (+) UUI    (+) pads:3/day, usually moderate wetness.  Daytime frequency: Q 3 hours.  Nocturia: NO.   (--) dysuria,  (--) hematuria,  (--) frequent UTIs.  (+) complete bladder emptying.   2)  POP:  Present. below introitus.  Symptoms:(--)    (--) vaginal bleeding. (+) vaginal discharge- scant white discharge (--) sexually active.  (--)  Vaginal dryness.  (+) vaginal estrogen use.   3)  BM:  (+) constipation/straining.  (--) chronic diarrhea. (--) hematochezia.  (--) fecal incontinence.  (--) fecal smearing/urgency.  (--) incomplete evacuation.  Taking benefiber 2 tsp twice daily.  4)pessary:  Denies pain or bleeding. Using #4 ring with support.  5)Patient had C. Diff and  + H. Pylori infections - followed by GI  Still has + abdominal pain and loose frequent stools.  Intermittent diarrhea alternating with constipation.  Complains of intermittent RUQ pain.    Changes from last visit:  1)  UI:  (--) ERIKA   (+) UUI    (+) pads:3/day, usually moderate wetness.  Daytime frequency: Q 3 hours.  Nocturia: NO.   (--) dysuria,  (--) hematuria,  (--) frequent UTIs.  (+) complete bladder emptying.     2)  POP:  Present. below introitus.  Symptoms:(--)    (--) vaginal bleeding. (+) vaginal discharge- scant white discharge (--) sexually active.  (--)  Vaginal dryness.  (+) vaginal estrogen use.     3)  BM:  (+) constipation/straining.  (--) chronic diarrhea. (--) hematochezia.  (--) fecal incontinence.  (--) fecal smearing/urgency.  (--) incomplete evacuation.  Not taking fiber regularly.    --has diverticulosis (with h/o diverticulitis)--trying to watch irritants (cheese, peanut butter)    4)pessary:  Denies pain or bleeding. Using #4 ring with support.    5)Patient  had C. Diff and  + H. Pylori infections - followed by GI  Still has + abdominal pain and loose frequent stools.  Intermittent diarrhea alternating with constipation.  Will start creon today.    12/2016  The uterus measures 7.1 x 3.3 x 3.8 cm. Uterine parenchyma is homogeneous with evidence of a few uterine fibroids.  One appears partially calcified and measures 0.7 x 0.6 x 0.3 cm.  The largest is in the myometrium of the uterine fundus measuring 1.6 x 1.6 x 1.6 cm.  Additionally there is a new fibroid anteriorly measuring 0.9 x 0.7 x 0.9 cm. The endometrial stripe is thickened and measures 0.7 cm.    The right ovary is normal in size and measures 1.1 1.0 x 1.2 cm. The left ovary is normal in size and measures 1.1 x 0.4 x 1.2 cm. Follicles are seen in both ovaries. Arterial and venous flow are preserved bilaterally with resistive indices of 0.7 on the right and 0.75 on the left. No free fluid is seen.   Impression     The uterus demonstrates presence of a few fibroids, 2 of which are stable in appearance, and 1 new fibroid.  There is endometrial thickening, underlying pathology cannot be excluded.  Consider further evaluation.  This report has been flagged in the [facility] medical record.     03/14/2017  embx  ATROPHIC ENDOMETRIUM.  NO HYPERPLASIA OR MALIGNANCY IDENTIFIED    Past Medical History:   Diagnosis Date    Arthritis     Diverticulitis     Hypertension        Past Surgical History:   Procedure Laterality Date    CHOLECYSTECTOMY      COLONOSCOPY N/A 10/5/2015    Procedure: COLONOSCOPY;  Surgeon: Perez White MD;  Location: [address]);  Service: Endoscopy;  Laterality: N/A;  C diff negative per Dr White/see microbiology report dated 8/26/15/svn    kidney stones removed         Current Outpatient Prescriptions   Medication Sig    amlodipine (NORVASC) 10 MG tablet Take 10 mg by mouth once daily.    amlodipine (NORVASC) 2.5 MG tablet Take 2.5 mg by mouth once daily.    aspirin-sod  "bicarb-citric acid (CHANO-SELTZER) 324 mg TbEF Take 325 mg by mouth once daily. Patient takes 1/4 tablet for upset stomach    atenolol (TENORMIN) 50 MG tablet Take by mouth. 1 Tablet Oral Every day    conjugated estrogens (PREMARIN) vaginal cream Place 0.5 g vaginally twice a week.    CREON 24,000-76,000 -120,000 unit capsule     hydrochlorothiazide (HYDRODIURIL) 25 MG tablet Take 25 mg by mouth once daily.    PREMARIN vaginal cream INSERT 1 APPLICATORFUL/DIME-SIZED AMOUNT WITH FINGER INTO VAGINA NIGHTLY X2 WEEKS,THEN TWICE WEEKLY     No current facility-administered medications for this visit.        Well woman:  Pap:04/2012- normal  Mammo:patient no longer wants to screen  Colonoscopy:  10/2015 diverticulosis, h. Pylori  Dexa:does not want to screen  ROS:  As per HPI.      Exam  /84 (BP Location: Left arm, Patient Position: Sitting)   Ht 5' 2" (1.575 m)   Wt 59 kg (130 lb 1.1 oz)   BMI 23.79 kg/m²   General: alert and oriented, no acute distress  Respiratory: normal respiratory effort  Abd: soft, non-tender, non-distended    Pelvic  Ext. Genitalia: normal external genitalia. Normal bartholin's and skeens glands  Vagina: + atrophy. Normal vaginal mucosa without lesions. No discharge noted.   Non-tender bladder base without palpable mass.  #4 ring with support in place.  Cervix: no lesions  Uterus:  uterus is normal size, shape, consistency and nontender   Urethra: no masses or tenderness  Urethral meatus: no lesions, caruncle or prolapse.    POP-Q: deferred    Pessary was removed without difficulty.  Washed with soap and water.  Replaced without difficulty.    Hypertension Medications             amlodipine (NORVASC) 10 MG tablet Take 10 mg by mouth once daily.    amlodipine (NORVASC) 2.5 MG tablet Take 2.5 mg by mouth once daily.    atenolol (TENORMIN) 50 MG tablet Take by mouth. 1 Tablet Oral Every day    hydrochlorothiazide (HYDRODIURIL) 25 MG tablet Take 25 mg by mouth once daily.            We " reviewed the above issues and discussed options for short-term versus long-term management of her problems.   Plan:   1)  Mixed urinary incontinence, urge > stress/prolapse:    --Empty bladder every 3 hours.  Empty well: wait a minute, lean forward on toilet.    --Avoid dietary irritants (see sheet).  Keep diary x 3-5 days to determine your irritants.  --KEGELS: do 10 in AM and 10 in PM, holding each x 10 seconds.  When you feel urge to go, STOP, KEGEL, and when urge has passed, then go to bathroom.  Consider PT in future.  --URGE: consider anticholinergic.   Takes 2-4 weeks to see if will have effect.  For dry mouth: get sour, sugar free lozenge or gum.    --STRESS:  Pessary vs. Sling. Schedule uds/cysto.   --continue use #4 ring with support pessary     2)Vaginal atrophy (dryness):  Use 1 gram of estrogen cream in vagina twice a week--use small amount with your finger around the opening as well  --trimosan-- use daily    3) Loose stool:  --restart daily fiber: 2 tsps -- take 2x/day  --avoid dietary irritants    4) Continue to followup with GI    5) RUQ pain/ liver cyst  --followed by hepatology  Hepatology Recommendations:  -  Ultrasound abd to evaluate liver cyst in 2/18  -  Return in 2/18    6)  Complete uterovaginal prolapse:  --continue pessary use  --would like to pursue surgical repair; not sexually active + h/o multiple bowel infections/recurrent diverticulosis; discussed increased safety profile of Lefort colpocleisis vs. Intraperitoneal procedure; she is ok with this plan  --pelvic US 12/16: normal but with EMB thickening  --pap 2012: benign; no h/o abnl paps  --EMBx 3/17: benign  --surgical plain:  D&C/hysteroscopy to address EMB thickening and LeFort colpocleisis; UDS/cysto scheduled to assess for need for MUS    RTC UDS/cystoscopy.  Will call you to pick surgery date.      30 minutes were spent in face to face time with this patient  75 % of this time was spent in counseling and/or coordination of  care [facility]  Division of Female Pelvic Medicine and Reconstructive Surgery  Department of Obstetrics & Gynecology

## 2017-08-08 NOTE — PATIENT INSTRUCTIONS
1)  Mixed urinary incontinence, urge > stress/prolapse:    --Empty bladder every 3 hours.  Empty well: wait a minute, lean forward on toilet.    --Avoid dietary irritants (see sheet).  Keep diary x 3-5 days to determine your irritants.  --KEGELS: do 10 in AM and 10 in PM, holding each x 10 seconds.  When you feel urge to go, STOP, KEGEL, and when urge has passed, then go to bathroom.  Consider PT in future.  --URGE: consider anticholinergic.   Takes 2-4 weeks to see if will have effect.  For dry mouth: get sour, sugar free lozenge or gum.    --STRESS:  Pessary vs. Sling.   --continue use #4 ring with support pessary     2)Vaginal atrophy (dryness):  Use 1 gram of estrogen cream in vagina twice a week--use small amount with your finger around the opening as well  --trimosan-- use daily    3) Loose stool:  --restart daily fiber: 2 tsps -- take 2x/day  --avoid dietary irritants    4) Continue to followup with GI    5) RUQ pain/ liver cyst  --followed by hepatology  Hepatology Recommendations:  -  Ultrasound abd to evaluate liver cyst in 2/18  -  Return in 2/18    6)  RTC UDS/cystoscopy.  Will call you to pick surgery date.

## 2017-08-11 ENCOUNTER — TELEPHONE (OUTPATIENT)
Dept: UROGYNECOLOGY | Facility: CLINIC | Age: 77
End: 2017-08-11

## 2017-08-11 NOTE — TELEPHONE ENCOUNTER
----- Message from Felicia Gates MD sent at 8/10/2017  5:46 PM CDT -----  Regarding: FW: Please call patient to pick OR date  Were you able to pick OR date yet?  Thanks!  ----- Message -----  From: Felicia Gates MD  Sent: 8/8/2017   7:21 PM  To: Madisyn Ramires NP, Felicia Gates MD  Subject: Please call patient to pick OR date              Hi:        Can you please schedule pt for surgery?    Today's date: 8/8/17    Surgery date: next available (can she do in early Sept?  Try to fill earlier open spots in Sept 1st)    MD: Yajaira    Additional info:   --total hours of procedure:  3  --other: none    Latex allergy: No    Location: [facility] [place] operated by [facility]    Dx:    Uterovaginal prolapse (complete), mixed urinary incontinence    Procedure:    --D&C/hysteroscopy to address EMB thickening + LeFort colpocleisis; possible MUS    Anesthesia: General endotracheal anesthesia    Need medical clearance? Yes - PCP  By the following MD's: PCP    Preop labs:   --patient gave me a sheet with labs done in 6/17 at OSH visit; looks like she had most of needed blood work; Reyna was supposed to submit for scanning--please find and make sure we can use these; if not, needs BMP, CBC, T&S, EKG.      Please schedule preop appt for:   1) consent signing with Dr. Gates: Yes   2) has UDS/cysto scheduled for 8/16  3)  BAPC anesthesia if needed  4)  PCP preop (need to review labs from OSH)    Thanks!

## 2017-08-11 NOTE — TELEPHONE ENCOUNTER
Attempted to reach patient regarding scheduling surgery.  Unable to leave message.  Madisyn Ramires, FNP-BC

## 2017-08-15 ENCOUNTER — TELEPHONE (OUTPATIENT)
Dept: UROGYNECOLOGY | Facility: CLINIC | Age: 77
End: 2017-08-15

## 2017-08-28 ENCOUNTER — TELEPHONE (OUTPATIENT)
Dept: UROGYNECOLOGY | Facility: CLINIC | Age: 77
End: 2017-08-28

## 2017-08-28 NOTE — TELEPHONE ENCOUNTER
Attempted to reach patient regarding proceeding with surgery.  She is not at home.  Will call tomorrow.  Madisyn Ramires, TRACEYP-BC

## 2017-08-28 NOTE — TELEPHONE ENCOUNTER
----- Message from Kieran Colindres sent at 8/28/2017  1:56 PM CDT -----  Contact: pt  x_ 1st Request   _ 2nd Request   _ 3rd Request     Who: YUE CLOUD [[id number]]    Why: pt is requesting a call back in reference to rescheduling her Cysto appointment from 8/24    What Number to Call Back: [phone]    When to Expect a call back: (Before the end of the day)   -- if call after 3:00 call back will be tomorrow.

## 2017-08-28 NOTE — TELEPHONE ENCOUNTER
----- Message from Felicia Gates MD sent at 8/8/2017  7:21 PM CDT -----  Regarding: Please call patient to pick OR date  Hi:        Can you please schedule pt for surgery?    Today's date: 8/8/17    Surgery date: next available (can she do in early Sept?  Try to fill earlier open spots in Sept 1st)    MD: Yajaira    Additional info:   --total hours of procedure:  3  --other: none    Latex allergy: No    Location: [facility]    Dx:    Uterovaginal prolapse (complete), mixed urinary incontinence    Procedure:    --D&C/hysteroscopy to address EMB thickening + LeFort colpocleisis; possible MUS    Anesthesia: General endotracheal anesthesia    Need medical clearance? Yes - PCP  By the following MD's: PCP    Preop labs:   --patient gave me a sheet with labs done in 6/17 at OSH visit; looks like she had most of needed blood work; Reyna was supposed to submit for scanning--please find and make sure we can use these; if not, needs BMP, CBC, T&S, EKG.      Please schedule preop appt for:   1) consent signing with Dr. Gates: Yes   2) has UDS/cysto scheduled for 8/16  3)  BAPC anesthesia if needed  4)  PCP preop (need to review labs from OSH)    Thanks!

## 2017-08-29 ENCOUNTER — TELEPHONE (OUTPATIENT)
Dept: UROGYNECOLOGY | Facility: CLINIC | Age: 77
End: 2017-08-29

## 2017-08-30 ENCOUNTER — TELEPHONE (OUTPATIENT)
Dept: UROGYNECOLOGY | Facility: CLINIC | Age: 77
End: 2017-08-30

## 2017-08-30 NOTE — TELEPHONE ENCOUNTER
----- Message from Kieran Colindres sent at 8/30/2017 11:56 AM CDT -----  Contact: pt  x_ 1st Request   _ 2nd Request   _ 3rd Request     Who: YUE CLOUD [[id number]]    Why: pt is requesting a call back in reference to her appointments on 9/7.    What Number to Call Back: [phone]    When to Expect a call back: (Before the end of the day)   -- if call after 3:00 call back will be tomorrow.

## 2017-09-07 ENCOUNTER — OFFICE VISIT (OUTPATIENT)
Dept: UROGYNECOLOGY | Facility: CLINIC | Age: 77
End: 2017-09-07
Payer: MEDICARE

## 2017-09-07 ENCOUNTER — PROCEDURE VISIT (OUTPATIENT)
Dept: UROGYNECOLOGY | Facility: CLINIC | Age: 77
End: 2017-09-07
Payer: MEDICARE

## 2017-09-07 VITALS
DIASTOLIC BLOOD PRESSURE: 70 MMHG | HEIGHT: 62 IN | SYSTOLIC BLOOD PRESSURE: 120 MMHG | WEIGHT: 130.06 LBS | BODY MASS INDEX: 23.93 KG/M2

## 2017-09-07 VITALS
HEIGHT: 62 IN | BODY MASS INDEX: 24.09 KG/M2 | WEIGHT: 130.94 LBS | SYSTOLIC BLOOD PRESSURE: 144 MMHG | DIASTOLIC BLOOD PRESSURE: 76 MMHG

## 2017-09-07 DIAGNOSIS — N39.3 STRESS INCONTINENCE IN FEMALE: ICD-10-CM

## 2017-09-07 DIAGNOSIS — N39.46 URINARY INCONTINENCE, MIXED: ICD-10-CM

## 2017-09-07 DIAGNOSIS — N95.2 VAGINAL ATROPHY: ICD-10-CM

## 2017-09-07 DIAGNOSIS — R19.5 LOOSE STOOLS: ICD-10-CM

## 2017-09-07 DIAGNOSIS — N81.4 UTEROVAGINAL PROLAPSE: Primary | ICD-10-CM

## 2017-09-07 DIAGNOSIS — N39.46 MIXED STRESS AND URGE URINARY INCONTINENCE: ICD-10-CM

## 2017-09-07 DIAGNOSIS — Z01.818 PREOP TESTING: ICD-10-CM

## 2017-09-07 DIAGNOSIS — N81.3 COMPLETE UTEROVAGINAL PROLAPSE: Primary | ICD-10-CM

## 2017-09-07 LAB
BILIRUB SERPL-MCNC: NORMAL MG/DL
BLOOD URINE, POC: NORMAL
COLOR, POC UA: NORMAL
GLUCOSE UR QL STRIP: NORMAL
KETONES UR QL STRIP: NORMAL
LEUKOCYTE ESTERASE URINE, POC: NORMAL
NITRITE, POC UA: NORMAL
PH, POC UA: 5
PROTEIN, POC: NORMAL
SPECIFIC GRAVITY, POC UA: 1.01
UROBILINOGEN, POC UA: NORMAL

## 2017-09-07 PROCEDURE — 51797 INTRAABDOMINAL PRESSURE TEST: CPT | Mod: S$GLB,,, | Performed by: OBSTETRICS & GYNECOLOGY

## 2017-09-07 PROCEDURE — 99213 OFFICE O/P EST LOW 20 MIN: CPT | Mod: 25,S$GLB,, | Performed by: NURSE PRACTITIONER

## 2017-09-07 PROCEDURE — 51728 CYSTOMETROGRAM W/VP: CPT | Mod: S$GLB,,, | Performed by: OBSTETRICS & GYNECOLOGY

## 2017-09-07 PROCEDURE — 51784 ANAL/URINARY MUSCLE STUDY: CPT | Mod: S$GLB,,, | Performed by: OBSTETRICS & GYNECOLOGY

## 2017-09-07 PROCEDURE — 3008F BODY MASS INDEX DOCD: CPT | Mod: S$GLB,,, | Performed by: NURSE PRACTITIONER

## 2017-09-07 PROCEDURE — 99999 PR PBB SHADOW E&M-EST. PATIENT-LVL III: CPT | Mod: PBBFAC,,, | Performed by: NURSE PRACTITIONER

## 2017-09-07 PROCEDURE — 1159F MED LIST DOCD IN RCRD: CPT | Mod: S$GLB,,, | Performed by: NURSE PRACTITIONER

## 2017-09-07 PROCEDURE — 52000 CYSTOURETHROSCOPY: CPT | Mod: S$GLB,,, | Performed by: OBSTETRICS & GYNECOLOGY

## 2017-09-07 PROCEDURE — 1126F AMNT PAIN NOTED NONE PRSNT: CPT | Mod: S$GLB,,, | Performed by: NURSE PRACTITIONER

## 2017-09-07 PROCEDURE — 81002 URINALYSIS NONAUTO W/O SCOPE: CPT | Mod: S$GLB,,, | Performed by: OBSTETRICS & GYNECOLOGY

## 2017-09-07 RX ORDER — LIDOCAINE HYDROCHLORIDE 20 MG/ML
JELLY TOPICAL ONCE
Status: COMPLETED | OUTPATIENT
Start: 2017-09-07 | End: 2017-09-07

## 2017-09-07 RX ORDER — CIPROFLOXACIN 500 MG/1
500 TABLET ORAL
Status: COMPLETED | OUTPATIENT
Start: 2017-09-07 | End: 2017-09-07

## 2017-09-07 RX ADMIN — LIDOCAINE HYDROCHLORIDE 5 ML: 20 JELLY TOPICAL at 02:09

## 2017-09-07 RX ADMIN — CIPROFLOXACIN 500 MG: 500 TABLET ORAL at 02:09

## 2017-09-07 NOTE — PROGRESS NOTES
TITLE OF OPERATION:  1. Complex cystometry.  2. Complex uroflowmetry.  3. Electromyography with surface electrodes.  4. Pressure voiding flow study.  5. Abdominal pressure measurement.  6. Leak point pressure measurement.    INDICATIONS:  1)  UI:  (--) ERIKA   (+) UUI    (+) pads:3/day, usually moderate wetness.  Daytime frequency: Q 3 hours.  Nocturia: NO.   (--) dysuria,  (--) hematuria,  (--) frequent UTIs.  (+) complete bladder emptying.   2)  POP:  Present. below introitus.  Symptoms:(--)    (--) vaginal bleeding. (+) vaginal discharge- scant white discharge (--) sexually active.  (--)  Vaginal dryness.  (+) vaginal estrogen use.   3)  BM:  (+) constipation/straining.  (--) chronic diarrhea. (--) hematochezia.  (--) fecal incontinence.  (--) fecal smearing/urgency.  (--) incomplete evacuation.  Taking benefiber 2 tsp twice daily.  4)pessary:  Denies pain or bleeding. Using #4 ring with support.  5)Patient had C. Diff and  + H. Pylori infections - followed by GI  Still has + abdominal pain and loose frequent stools.  Intermittent diarrhea alternating with constipation.  Complains of intermittent RUQ pain.     Changes from last visit:  1)  UI:  (--) ERIKA   (+) UUI    (+) pads:3/day, usually moderate wetness.  Daytime frequency: Q 3 hours.  Nocturia: NO.   (--) dysuria,  (--) hematuria,  (--) frequent UTIs.  (+) complete bladder emptying.      2)  POP:  Present. below introitus.  Symptoms:(--)    (--) vaginal bleeding. (+) vaginal discharge- scant white discharge (--) sexually active.  (--)  Vaginal dryness.  (+) vaginal estrogen use.      3)  BM:  (+) constipation/straining.  (--) chronic diarrhea. (--) hematochezia.  (--) fecal incontinence.  (--) fecal smearing/urgency.  (--) incomplete evacuation.  Not taking fiber regularly.    --has diverticulosis (with h/o diverticulitis)--trying to watch irritants (cheese, peanut butter)     4)pessary:  Denies pain or bleeding. Using #4 ring with support.     5)Patient had C.  Diff and  + H. Pylori infections - followed by GI  Still has + abdominal pain and loose frequent stools.  Intermittent diarrhea alternating with constipation.  Will start creon today.     12/2016  The uterus measures 7.1 x 3.3 x 3.8 cm. Uterine parenchyma is homogeneous with evidence of a few uterine fibroids.  One appears partially calcified and measures 0.7 x 0.6 x 0.3 cm.  The largest is in the myometrium of the uterine fundus measuring 1.6 x 1.6 x 1.6 cm.  Additionally there is a new fibroid anteriorly measuring 0.9 x 0.7 x 0.9 cm. The endometrial stripe is thickened and measures 0.7 cm.    The right ovary is normal in size and measures 1.1 1.0 x 1.2 cm. The left ovary is normal in size and measures 1.1 x 0.4 x 1.2 cm. Follicles are seen in both ovaries. Arterial and venous flow are preserved bilaterally with resistive indices of 0.7 on the right and 0.75 on the left. No free fluid is seen.   Impression      The uterus demonstrates presence of a few fibroids, 2 of which are stable in appearance, and 1 new fibroid.  There is endometrial thickening, underlying pathology cannot be excluded.  Consider further evaluation.  This report has been flagged in the [facility] medical record.      03/14/2017  embx  ATROPHIC ENDOMETRIUM.  NO HYPERPLASIA OR MALIGNANCY IDENTIFIED      PREOPERATIVE DIAGNOSIS:  1)  Mixed urinary incontinence   2)  Vaginal atrophy  3)  Loose stool  4)  RUQ pain/ liver cyst  5)  Complete (stage 4) uterovaginal prolapse     POSTOPERATIVE DIAGNOSIS:  1)  Mixed urinary incontinence   2)  Vaginal atrophy  3)  Loose stool  4)  RUQ pain/ liver cyst  5)  Complete (stage 4) uterovaginal prolapse   6)  Slow stream  7)  Urodynamic stress incontinence  8)  Decreased urethral coaptation, mild    ANESTHESIA:  None.    SPECIMEN (BACTERIOLOGICAL, PATHOLOGICAL OR OTHER):  None.    PROSTHETIC DEVICE/IMPLANT:  None.    SURGEONS NARRATIVE:  A time out was performed in which the patient identity and procedure were  confirmed.  Urodynamic evaluation was performed using a computerized system ([employer]  Uroflowmetry was performed on the patient in the sitting position without catheters in place.  Subsequent urodynamic testing was performed with the patient in the lithotomy position at 45 degrees. Air charged catheters were used with sterile water as the infusion medium. Vesical and abdominal (rectal) pressures were measured, and detrusor pressure was calculated. EMG activity was recorded with surface electrodes. During filling, room temperature sterile water was infused at a rate of 30 cubic centimeters per minute. The patient was asked cough after instillation of each 100cc volume. Two Valsalva leak point pressures and two cough leak point pressures were performed with the catheters in place at 300 cubic centimeters and again at maximum capacity. Valsalva leak point pressure was defined as the difference between vesical pressure at which leakage was noted (visualized at the external urethral meatus) and the baseline vesical pressure. Following urodynamic testing, a pressure flow study was performed with the patient in the sitting position. Vesical and abdominal pressures were monitored and detrusor pressures were calculated. After the pressure flow study, the catheters were then removed. The patient tolerated the procedure well.     Urine dipstick: neg.    1.  VOIDING PHASE:      a.  Uroflowmetry:  Not performed.      b.  Pressure flow:   Prolapse reduction: Yes (pessary)0   Voided volume:   458 mL   Voiding time:   507 seconds   Peak flow:  9 mL/s    Avg flow:  2 mL/s   Max det pressure:  36  cm H20   Det pressure at max flow: 29 cm H20   Void initiated by unable to discern as pves was dislodged during study.     Urethral relaxation (EMG):  absent.     PVR (calculated):  0 mL    The overall configuration of this pressure flow study was prolonged with indeterminate mechanism of void.      2.   FILLING PHASE:   1st desire: 98 mL   Normal desire:  214 mL   Strong desire:  329 mL   Urgency:  397 mL   Compliance (calculated)  ~20 mL/cm H20   EMG activity during filling:  stable   Detrusor contractions observed: No.     3.  URETHRAL FUNCTION/STORAGE PHASE:    a.  WITH prolapse reduction:   CLPP (150 mL): Negative  at  130 cm H20   VLPP (150 mL): Negative  at  76 cm H20    CLPP (300 mL): Positive  at  150 cm H20   VLPP (300 mL): Positive  at  138 cm H20    CLPP (MAX ):    Positive  at  128 cm H20   VLPP (MAX):     Positive  at  82 cm H20    These findings are consistent with Positive urodynamic stress incontinence.    Assessment:  UF not completed.  PF prolonged with indeterminate mechanism of void.  Compliance low normal.  Max capacity 458 mL.  DO (--).  ERIKA (+).      Plan:  1)  Mixed urinary incontinence, urge > stress/prolapse:    --Empty bladder every 3 hours.  Empty well: wait a minute, lean forward on toilet.    --Avoid dietary irritants (see sheet).  Keep diary x 3-5 days to determine your irritants.  --KEGELS: do 10 in AM and 10 in PM, holding each x 10 seconds.  When you feel urge to go, STOP, KEGEL, and when urge has passed, then go to bathroom.  Consider PT in future.  --URGE: consider anticholinergic.   Takes 2-4 weeks to see if will have effect.  For dry mouth: get sour, sugar free lozenge or gum.    --STRESS:  Add MUS to POP procedure.     2)Vaginal atrophy (dryness):  Use 1 gram of estrogen cream in vagina twice a week--use small amount with your finger around the opening as well  --trimosan-- use daily     3) Loose stool:  --restart daily fiber: 2 tsps -- take 2x/day  --avoid dietary irritants     4) Continue to followup with GI     5) RUQ pain/ liver cyst  --followed by hepatology  Hepatology Recommendations:  -  Ultrasound abd to evaluate liver cyst in 2/18  -  Return in 2/18     6)  Complete uterovaginal prolapse:  --continue pessary use  --would like to pursue surgical repair;  not sexually active + h/o multiple bowel infections/recurrent diverticulosis; discussed increased safety profile of Lefort colpocleisis vs. Intraperitoneal procedure; she is ok with this plan  --pelvic US 12/16: normal but with EMB thickening  --pap 2012: benign; no h/o abnl paps  --EMBx 3/17: benign  --surgical plain:  D&C/hysteroscopy to address EMB thickening and LeFort colpocleisis; UDS/cysto scheduled to assess for need for MUS     -------------------------------------------------------------    Title of Operation:   Cystourethroscopy.     INDICATIONS:  As above    PREOPERATIVE DIAGNOSIS  As above    POSTOPERATIVE DIAGNOSIS:   As above    Anesthesia:   2% Xylocaine gel.    Specimen (Bacteriological, Pathological or other):   None.     Prosthetic Device/Implant:   None.     Surgeons Narrative:     After informed consent was obtained, the patient was placed in the lithotomy position. The urethral meatus was prepped with Betadine and 10 cubic centimeters of 2% Xylocaine gel were introduced into the urethra. A flexible cystourethroscope was introduced into the bladder. The bladder was distended with approximately 300 cubic centimeters of sterile water. A systematic survey was performed in which the bladder was surveyed using multiple sequential passes in a clockwise fashion from the bladder dome to the bladder base to the urethrovesical junction. The trigone and ureteral orifices were observed. The scope was then flipped back on itself, and the urethrovesical junction was viewed. A vaginal examining finger was then placed with pressure suburethrally at the urethrovesical junction as the telescope was withdrawn in order to perform positive pressure urethroscopy.  Standard maneuvers of cough, squeeze and Valsalva were performed. The telescope was then completely withdrawn.     Findings: Urethroscopy:  Normal with mild decrease in coaptation.  Cystoscopy:  Normal bladder mucosa, bilateral ureteral flow was noted.      Assessment: Essentially normal cystourethroscopy with mild decrease in urethral coaptation.     Plan: The patient will follow up with Dr. Gates as scheduled.  See urodynamics note from 9/7/16 for further plan details.

## 2017-09-07 NOTE — PROGRESS NOTES
Urogyn follow up  09/07/2017  .  [facility]  [address] [facility] [address]    Linda Felton  [id number]  1940      Linda Felton is a 77 y.o. here for a urogyn follow up.    1)  UI:  (--) ERIKA   (+) UUI    (+) pads:3/day, usually moderate wetness.  Daytime frequency: Q 3 hours.  Nocturia: NO.   (--) dysuria,  (--) hematuria,  (--) frequent UTIs.  (+) complete bladder emptying.      2)  POP:  Present. below introitus.  Symptoms:(--)    (--) vaginal bleeding. (+) vaginal discharge- scant white discharge (--) sexually active.  (--)  Vaginal dryness.  (+) vaginal estrogen use.      3)  BM:  (+) constipation/straining.  (--) chronic diarrhea. (--) hematochezia.  (--) fecal incontinence.  (--) fecal smearing/urgency.  (--) incomplete evacuation.  Not taking fiber regularly.    --has diverticulosis (with h/o diverticulitis)--trying to watch irritants (cheese, peanut butter)     4)pessary:  Denies pain or bleeding. Using #4 ring with support.     5)Patient had C. Diff and  + H. Pylori infections - followed by GI  Still has + abdominal pain and loose frequent stools.  Intermittent diarrhea alternating with constipation.       12/2016  The uterus measures 7.1 x 3.3 x 3.8 cm. Uterine parenchyma is homogeneous with evidence of a few uterine fibroids.  One appears partially calcified and measures 0.7 x 0.6 x 0.3 cm.  The largest is in the myometrium of the uterine fundus measuring 1.6 x 1.6 x 1.6 cm.  Additionally there is a new fibroid anteriorly measuring 0.9 x 0.7 x 0.9 cm. The endometrial stripe is thickened and measures 0.7 cm.    The right ovary is normal in size and measures 1.1 1.0 x 1.2 cm. The left ovary is normal in size and measures 1.1 x 0.4 x 1.2 cm. Follicles are seen in both ovaries. Arterial and venous flow are preserved bilaterally with resistive indices of 0.7 on the right and 0.75 on the left. No free fluid is seen.   Impression      The uterus demonstrates presence  of a few fibroids, 2 of which are stable in appearance, and 1 new fibroid.  There is endometrial thickening, underlying pathology cannot be excluded.  Consider further evaluation.  This report has been flagged in the [facility] medical record.      03/14/2017  embx  ATROPHIC ENDOMETRIUM.  NO HYPERPLASIA OR MALIGNANCY IDENTIFIED    09/07/2017  Suds  1.  VOIDING PHASE:       a.  Uroflowmetry:  Not performed.       b.  Pressure flow:  · Prolapse reduction: Yes (pessary)0  · Voided volume:   458 mL  · Voiding time:   507 seconds  · Peak flow:  9 mL/s   · Avg flow:  2 mL/s  · Max det pressure:  36  cm H20  · Det pressure at max flow: 29 cm H20  · Void initiated by unable to discern as pves was dislodged during study.    · Urethral relaxation (EMG):  absent.    · PVR (calculated):  0 mL     The overall configuration of this pressure flow study was prolonged with indeterminate mechanism of void.       2.  FILLING PHASE:  · 1st desire: 98 mL  · Normal desire:  214 mL  · Strong desire:  329 mL  · Urgency:  397 mL  · Compliance (calculated)  ~20 mL/cm H20  · EMG activity during filling:  stable  · Detrusor contractions observed: No.      3.  URETHRAL FUNCTION/STORAGE PHASE:     a.  WITH prolapse reduction:  · CLPP (150 mL): Negative  at  130 cm H20  · VLPP (150 mL): Negative  at  76 cm H20   · CLPP (300 mL): Positive  at  150 cm H20  · VLPP (300 mL): Positive  at  138 cm H20   · CLPP (MAX ):    Positive  at  128 cm H20  · VLPP (MAX):     Positive  at  82 cm H20     These findings are consistent with Positive urodynamic stress incontinence.     Assessment:  UF not completed.  PF prolonged with indeterminate mechanism of void.  Compliance low normal.  Max capacity 458 mL.  DO (--).  ERIKA (+).       Cysto    Findings: Urethroscopy:  Normal with mild decrease in coaptation.  Cystoscopy:  Normal bladder mucosa, bilateral ureteral flow was noted.     Assessment: Essentially normal cystourethroscopy with mild decrease in urethral  "coaptation         Past Medical History:   Diagnosis Date    Arthritis     Diverticulitis     Hypertension        Past Surgical History:   Procedure Laterality Date    CHOLECYSTECTOMY      COLONOSCOPY N/A 10/5/2015    Procedure: COLONOSCOPY;  Surgeon: Perez White MD;  Location: [facility] ([address] [place] [address]);  Service: Endoscopy;  Laterality: N/A;  C diff negative per Dr White/see microbiology report dated 8/26/15/svn    kidney stones removed         Current Outpatient Prescriptions   Medication Sig    amlodipine (NORVASC) 2.5 MG tablet Take 2.5 mg by mouth once daily.    aspirin-sod bicarb-citric acid (CHANO-SELTZER) 324 mg TbEF Take 325 mg by mouth once daily. Patient takes 1/4 tablet for upset stomach    atenolol (TENORMIN) 50 MG tablet Take by mouth. 1 Tablet Oral Every day    conjugated estrogens (PREMARIN) vaginal cream Place 0.5 g vaginally twice a week.    CREON 24,000-76,000 -120,000 unit capsule     hydrochlorothiazide (HYDRODIURIL) 25 MG tablet Take 25 mg by mouth once daily.    PREMARIN vaginal cream INSERT 1 APPLICATORFUL/DIME-SIZED AMOUNT WITH FINGER INTO VAGINA NIGHTLY X2 WEEKS,THEN TWICE WEEKLY     No current facility-administered medications for this visit.        ROS:  As per HPI.      Exam  /70 (BP Location: Right arm, Patient Position: Sitting)   Ht 5' 2" (1.575 m)   Wt 59 kg (130 lb 1.1 oz)   BMI 23.79 kg/m²   General: alert and oriented, no acute distress  Respiratory: normal respiratory effort  Abd: soft, non-tender, non-distended    Pelvic--deferred      Impression  1. Complete uterovaginal prolapse     2. Mixed stress and urge urinary incontinence     3. Vaginal atrophy     4. Loose stools       We reviewed the above issues and discussed options for short-term versus long-term management of her problems.   Plan:   1. Patient consented with Dr. Gates for hysteroscopic D&C, Lefort colpocleisis, possible hysterectomy, possible BSO, possible use of laparoscope, placement of " synthetic midurethral sling, and cystourethroscopy.   R/B/A reviewed. Specific risks reviewed include:  infection, bleeding, need for blood transfusion, damage to surrounding structures, anesthesia risks, death, heart attack, stroke, mesh erosion/extrusion, pain, dyspareunia, urinary retention, voiding dysfunction, urinary incontinence, exacerbation of urinary urge incontinence, and need for further surgeries.  We reviewed potential for failure of POP defect repair and need for future surgery, with no way of predicting risk.  She understands success rate of ASC approaches 85%.  Success rate of midurethral sling for ERIKA was reviewed as 80-85%, and she understands that this will not necessarily impact other types of urinary incontinence.  Alternatives reviewed include: pessary/PT for POP and pessary/periurethral injections/PT/medication for ERIKA.    2. T&S, urine HCG on DOS  3. Preoperative appointment with PCP or cardiology: yes-- pending  4. VTE Prophylaxis:  heparin 5000 u SQ TID (1st dose 2hrs preop) + SCDs  5. Patient instructed on Miralax and  chlorahexadine/dial soap prep to perform day before & AM of surgery.   6. Proceed to OR for above-mentioned procedure.      30 minutes were spent in face to face time with this patient  75 % of this time was spent in counseling and/or coordination of care    CHEVY Duval-BC [facility]  Division of Female Pelvic Medicine and Reconstructive Surgery  Department of Obstetrics & Gynecology

## 2017-09-08 ENCOUNTER — TELEPHONE (OUTPATIENT)
Dept: UROGYNECOLOGY | Facility: CLINIC | Age: 77
End: 2017-09-08

## 2017-09-08 NOTE — TELEPHONE ENCOUNTER
"Spoke to patient.  Patient complaining of lower intestinal pain and nausea.  Patient stated she felt is was brought on by the Simple Urodynamics Study done on 9/7/2017.  I explained to patient that this could be a virus.  Patient stated, "I don't think that it's a virus".  I offered patient an appointment today (9/8/2017) at 2:30 pm  with Tonio DUMONT.  "

## 2017-09-08 NOTE — TELEPHONE ENCOUNTER
----- Message from Parag Kessler sent at 9/8/2017 10:30 AM CDT -----  Contact: pt  x_ 1st Request  _ 2nd Request  _ 3rd Request    Who: pt    Why: is returning a call from staff    What Number to Call Back: [phone]    When to Expect a call back: (Before the end of the day)  -- if call after 3:00 call back will be tomorrow.

## 2017-09-08 NOTE — TELEPHONE ENCOUNTER
----- Message from Arcelia Sutherland sent at 9/8/2017  8:35 AM CDT -----  Contact: self  Pt needing a call back, pt was seen on yesterday, she can be reached at [phone].

## 2017-09-13 ENCOUNTER — TELEPHONE (OUTPATIENT)
Dept: UROGYNECOLOGY | Facility: CLINIC | Age: 77
End: 2017-09-13

## 2017-09-13 NOTE — TELEPHONE ENCOUNTER
----- Message from Kylee Maguire sent at 9/13/2017 12:59 PM CDT -----  Contact: pt  x_  1st Request  _  2nd Request  _  3rd Request      Who:pt    Why: pt has questions regarding her surgery    What Number to Call Back: [phone]    When to Expect a call back: (Before the end of the day)   -- if call after 3:00 call back will be tomorrow.

## 2017-09-28 ENCOUNTER — TELEPHONE (OUTPATIENT)
Dept: GASTROENTEROLOGY | Facility: CLINIC | Age: 77
End: 2017-09-28

## 2017-09-28 NOTE — TELEPHONE ENCOUNTER
----- Message from Qiana Pérezkert sent at 9/28/2017 10:47 AM CDT -----  Contact: self - [phone]  rosa - leoras appt with dr lee - diarrhea, vomiting, nausea - please call patient at [phone]

## 2018-02-27 ENCOUNTER — OFFICE VISIT (OUTPATIENT)
Dept: UROGYNECOLOGY | Facility: CLINIC | Age: 78
End: 2018-02-27
Payer: MEDICARE

## 2018-02-27 DIAGNOSIS — N95.2 VAGINAL ATROPHY: ICD-10-CM

## 2018-02-27 DIAGNOSIS — N81.11 MIDLINE CYSTOCELE: ICD-10-CM

## 2018-02-27 DIAGNOSIS — N81.4 UTERINE PROLAPSE: Primary | ICD-10-CM

## 2018-02-27 PROCEDURE — 99999 PR PBB SHADOW E&M-EST. PATIENT-LVL III: CPT | Mod: PBBFAC,,, | Performed by: NURSE PRACTITIONER

## 2018-02-27 PROCEDURE — 99213 OFFICE O/P EST LOW 20 MIN: CPT | Mod: S$GLB,,, | Performed by: NURSE PRACTITIONER

## 2018-02-27 NOTE — PROGRESS NOTES
Urogyn follow up  02/27/2018  .  [facility]  [address]  [facility] [address]    Linda Felton  [id number]  1940      Linda Felton is a 78 y.o. here for a urogyn follow up.    1)  UI:  (--) ERIKA   (+) UUI    (+) pads:3/day, usually moderate wetness.  Daytime frequency: Q 3 hours.  Nocturia: NO.   (--) dysuria,  (--) hematuria,  (--) frequent UTIs.  (+) complete bladder emptying.      2)  POP:  Present. below introitus.  Symptoms:(--)    (--) vaginal bleeding. (+) vaginal discharge- scant pink discharge (--) sexually active.  (--)  Vaginal dryness.  (+) vaginal estrogen use.      3)  BM:  (--) constipation/straining.  (--) chronic diarrhea. (--) hematochezia.  (--) fecal incontinence.  (--) fecal smearing/urgency.  (--) incomplete evacuation.  Not taking fiber regularly.    --has diverticulosis (with h/o diverticulitis)--trying to watch irritants (cheese, peanut butter)  --symptoms have been better-- denies diarrhea   --Patient had C. Diff and  + H. Pylori infections   --had incarcerated hernia in     4)pessary:  Denies pain or bleeding. Using #4 ring with support.     5)Patient had C. Diff and  + H. Pylori infections - followed by GI  Still has + abdominal pain and loose frequent stools.  Intermittent diarrhea alternating with constipation.         12/2016  The uterus measures 7.1 x 3.3 x 3.8 cm. Uterine parenchyma is homogeneous with evidence of a few uterine fibroids.  One appears partially calcified and measures 0.7 x 0.6 x 0.3 cm.  The largest is in the myometrium of the uterine fundus measuring 1.6 x 1.6 x 1.6 cm.  Additionally there is a new fibroid anteriorly measuring 0.9 x 0.7 x 0.9 cm. The endometrial stripe is thickened and measures 0.7 cm.    The right ovary is normal in size and measures 1.1 1.0 x 1.2 cm. The left ovary is normal in size and measures 1.1 x 0.4 x 1.2 cm. Follicles are seen in both ovaries. Arterial and venous flow are preserved bilaterally with  resistive indices of 0.7 on the right and 0.75 on the left. No free fluid is seen.   Impression      The uterus demonstrates presence of a few fibroids, 2 of which are stable in appearance, and 1 new fibroid.  There is endometrial thickening, underlying pathology cannot be excluded.  Consider further evaluation.  This report has been flagged in the [facility] medical record.      03/14/2017  embx  ATROPHIC ENDOMETRIUM.  NO HYPERPLASIA OR MALIGNANCY IDENTIFIED    09/07/2017  Suds  1.  VOIDING PHASE:       a.  Uroflowmetry:  Not performed.       b.  Pressure flow:  · Prolapse reduction: Yes (pessary)0  · Voided volume:   458 mL  · Voiding time:   507 seconds  · Peak flow:  9 mL/s   · Avg flow:  2 mL/s  · Max det pressure:  36  cm H20  · Det pressure at max flow: 29 cm H20  · Void initiated by unable to discern as pves was dislodged during study.    · Urethral relaxation (EMG):  absent.    · PVR (calculated):  0 mL     The overall configuration of this pressure flow study was prolonged with indeterminate mechanism of void.       2.  FILLING PHASE:  · 1st desire: 98 mL  · Normal desire:  214 mL  · Strong desire:  329 mL  · Urgency:  397 mL  · Compliance (calculated)  ~20 mL/cm H20  · EMG activity during filling:  stable  · Detrusor contractions observed: No.      3.  URETHRAL FUNCTION/STORAGE PHASE:     a.  WITH prolapse reduction:  · CLPP (150 mL): Negative  at  130 cm H20  · VLPP (150 mL): Negative  at  76 cm H20   · CLPP (300 mL): Positive  at  150 cm H20  · VLPP (300 mL): Positive  at  138 cm H20   · CLPP (MAX ):    Positive  at  128 cm H20  · VLPP (MAX):     Positive  at  82 cm H20     These findings are consistent with Positive urodynamic stress incontinence.     Assessment:  UF not completed.  PF prolonged with indeterminate mechanism of void.  Compliance low normal.  Max capacity 458 mL.  DO (--).  ERIKA (+).       Cysto    Findings: Urethroscopy:  Normal with mild decrease in coaptation.  Cystoscopy:  Normal bladder  "mucosa, bilateral ureteral flow was noted.     Assessment: Essentially normal cystourethroscopy with mild decrease in urethral coaptation         Past Medical History:   Diagnosis Date    Arthritis     Diverticulitis     Hypertension        Past Surgical History:   Procedure Laterality Date    CHOLECYSTECTOMY      COLONOSCOPY N/A 10/5/2015    Procedure: COLONOSCOPY;  Surgeon: Perez White MD;  Location: [facility] ([address] [place] [address]);  Service: Endoscopy;  Laterality: N/A;  C diff negative per Dr White/see microbiology report dated 8/26/15/svn    HERNIA REPAIR      kidney stones removed         Current Outpatient Prescriptions   Medication Sig    amlodipine (NORVASC) 2.5 MG tablet Take 2.5 mg by mouth once daily.    aspirin-sod bicarb-citric acid (CHANO-SELTZER) 324 mg TbEF Take 325 mg by mouth once daily. Patient takes 1/4 tablet for upset stomach    atenolol (TENORMIN) 50 MG tablet Take by mouth. 1 Tablet Oral Every day    conjugated estrogens (PREMARIN) vaginal cream Place 0.5 g vaginally twice a week.    CREON 24,000-76,000 -120,000 unit capsule     FLUZONE HIGH-DOSE 2017-18, PF, 180 mcg/0.5 mL vaccine     hydrochlorothiazide (HYDRODIURIL) 25 MG tablet Take 25 mg by mouth once daily.    PREMARIN vaginal cream INSERT 1 APPLICATORFUL/DIME-SIZED AMOUNT WITH FINGER INTO VAGINA NIGHTLY X2 WEEKS,THEN TWICE WEEKLY     No current facility-administered medications for this visit.        ROS:  As per HPI.      Exam  /74 (BP Location: Right arm, Patient Position: Sitting)   Ht 5' 2" (1.575 m)   Wt 59 kg (130 lb 1.1 oz)   BMI 23.79 kg/m²   General: alert and oriented, no acute distress  Respiratory: normal respiratory effort  Abd: soft, non-tender, non-distended    Pelvic  Ext. Genitalia: normal external genitalia. Normal bartholin's and skeens glands  Vagina: + atrophy. Normal vaginal mucosa without lesions. Scant tan discharge noted. Granulation tissue noted a 3 and 9 o'clock positions-- silver nitrate " applied.   Non-tender bladder base without palpable mass.  #4 ring with support in place.  Cervix: no lesions  Uterus:  uterus is normal size, shape, consistency and nontender   Urethra: no masses or tenderness  Urethral meatus: no lesions, caruncle or prolapse.     POP-Q: deferred     Pessary was removed without difficulty.  Washed with soap and water.  Replaced without difficulty.      Impression  1. Uterine prolapse     2. Midline cystocele     3. Vaginal atrophy       We reviewed the above issues and discussed options for short-term versus long-term management of her problems.   Plan:   1)  Mixed urinary incontinence, urge > stress/prolapse:    --Empty bladder every 3 hours.  Empty well: wait a minute, lean forward on toilet.    --Avoid dietary irritants (see sheet).  Keep diary x 3-5 days to determine your irritants.  --KEGELS: do 10 in AM and 10 in PM, holding each x 10 seconds.  When you feel urge to go, STOP, KEGEL, and when urge has passed, then go to bathroom.  Consider PT in future.  --URGE: consider anticholinergic.   Takes 2-4 weeks to see if will have effect.  For dry mouth: get sour, sugar free lozenge or gum.    --STRESS:  Pessary vs. Sling. Schedule uds/cysto.   --continue use #4 ring with support pessary      2)Vaginal atrophy (dryness):  Use 1 gram of estrogen cream in vagina twice a week--use small amount with your finger around the opening as well  --trimosan-- use on days you are not using estrogen cream     3) Loose stool:  --restart daily fiber: 2 tsps -- take 2x/day  --avoid dietary irritants     4) Continue to followup with GI     5) RUQ pain/ liver cyst  --followed by hepatology  Hepatology Recommendations:  -  Ultrasound abd to evaluate liver cyst in 2/18  -  Return in 2/18     6)  Complete uterovaginal prolapse:  --continue pessary use  --would like to pursue surgical repair; not sexually active + h/o multiple bowel infections/recurrent diverticulosis; discussed increased safety profile  of Lefort colpocleisis vs. Intraperitoneal procedure; she is ok with this plan  --pelvic US 12/16: normal but with EMB thickening  --pap 2012: benign; no h/o abnl paps  --EMBx 3/17: benign  --surgical plain:  D&C/hysteroscopy to address EMB thickening and LeFort colpocleisis was scheduled-- cancelled due to hernia surgery    7)Will consult Dr. Gates    30 minutes were spent in face to face time with this patient  75 % of this time was spent in counseling and/or coordination of care    CHEVY Duval-BC [facility]  Division of Female Pelvic Medicine and Reconstructive Surgery  Department of Obstetrics & Gynecology

## 2018-02-27 NOTE — PATIENT INSTRUCTIONS
1)  Mixed urinary incontinence, urge > stress/prolapse:    --Empty bladder every 3 hours.  Empty well: wait a minute, lean forward on toilet.    --Avoid dietary irritants (see sheet).  Keep diary x 3-5 days to determine your irritants.  --KEGELS: do 10 in AM and 10 in PM, holding each x 10 seconds.  When you feel urge to go, STOP, KEGEL, and when urge has passed, then go to bathroom.  Consider PT in future.  --URGE: consider anticholinergic.   Takes 2-4 weeks to see if will have effect.  For dry mouth: get sour, sugar free lozenge or gum.    --STRESS:  Pessary vs. Sling. Schedule uds/cysto.   --continue use #4 ring with support pessary      2)Vaginal atrophy (dryness):  Use 1 gram of estrogen cream in vagina twice a week--use small amount with your finger around the opening as well  --trimosan-- use on days you are not using estrogen cream     3) Loose stool:  --restart daily fiber: 2 tsps -- take 2x/day  --avoid dietary irritants     4) Continue to followup with GI     5) RUQ pain/ liver cyst  --followed by hepatology  Hepatology Recommendations:  -  Ultrasound abd to evaluate liver cyst in 2/18  -  Return in 2/18     6)  Complete uterovaginal prolapse:  --continue pessary use  --would like to pursue surgical repair; not sexually active + h/o multiple bowel infections/recurrent diverticulosis; discussed increased safety profile of Lefort colpocleisis vs. Intraperitoneal procedure; she is ok with this plan  --pelvic US 12/16: normal but with EMB thickening  --pap 2012: benign; no h/o abnl paps  --EMBx 3/17: benign  --surgical plain:  D&C/hysteroscopy to address EMB thickening and LeFort colpocleisis was scheduled-- cancelled due to hernia surgery    7)Will consult Dr. Gates

## 2018-02-28 VITALS
SYSTOLIC BLOOD PRESSURE: 122 MMHG | DIASTOLIC BLOOD PRESSURE: 74 MMHG | BODY MASS INDEX: 23.93 KG/M2 | WEIGHT: 130.06 LBS | HEIGHT: 62 IN

## 2018-06-29 ENCOUNTER — TELEPHONE (OUTPATIENT)
Dept: UROGYNECOLOGY | Facility: CLINIC | Age: 78
End: 2018-06-29

## 2018-06-29 NOTE — TELEPHONE ENCOUNTER
----- Message from Brii Ibrahim sent at 6/29/2018 11:01 AM CDT -----  Contact: YUE CLOUD [[id number]]            Name of Who is Calling: YUE CLOUD [[id number]]    What is the request in detail: patient would like a call back states she would like to speak with NP. Patient did not give reason why. Please call     Can the clinic reply by [facility]: no    What Number to Call Back if not in [facility]: [phone]

## 2018-07-05 ENCOUNTER — OFFICE VISIT (OUTPATIENT)
Dept: UROGYNECOLOGY | Facility: CLINIC | Age: 78
End: 2018-07-05
Payer: MEDICARE

## 2018-07-05 VITALS
BODY MASS INDEX: 25.8 KG/M2 | WEIGHT: 140.19 LBS | DIASTOLIC BLOOD PRESSURE: 70 MMHG | SYSTOLIC BLOOD PRESSURE: 118 MMHG | HEIGHT: 62 IN

## 2018-07-05 DIAGNOSIS — N95.2 ATROPHIC VAGINITIS: ICD-10-CM

## 2018-07-05 DIAGNOSIS — K76.89 LIVER CYST: ICD-10-CM

## 2018-07-05 DIAGNOSIS — R93.89 ABNORMAL PELVIC ULTRASOUND: Primary | ICD-10-CM

## 2018-07-05 PROCEDURE — 99999 PR PBB SHADOW E&M-EST. PATIENT-LVL III: CPT | Mod: PBBFAC,,, | Performed by: OBSTETRICS & GYNECOLOGY

## 2018-07-05 PROCEDURE — 99213 OFFICE O/P EST LOW 20 MIN: CPT | Mod: S$GLB,,, | Performed by: OBSTETRICS & GYNECOLOGY

## 2018-07-05 RX ORDER — MELOXICAM 15 MG/1
15 TABLET ORAL DAILY
Refills: 0 | COMMUNITY
Start: 2018-05-29 | End: 2018-10-16

## 2018-07-05 NOTE — PROGRESS NOTES
Urogyn follow up  02/27/2018  .  [facility] [address] [place] [address]    Linda Felton  [id number]  1940      iLnda Felton is a 78 y.o. here for a urogyn follow up.    1)  UI:  (--) ERIKA   (+) UUI    (+) pads:3/day, usually moderate wetness.  Daytime frequency: Q 3 hours.  Nocturia: NO.   (--) dysuria,  (--) hematuria,  (--) frequent UTIs.  (+) complete bladder emptying.      2)  POP:  Present. below introitus.  Symptoms:(--)    (--) vaginal bleeding. (+) vaginal discharge- scant pink discharge (--) sexually active.  (--)  Vaginal dryness.  (+) vaginal estrogen use.      3)  BM:  (--) constipation/straining.  (--) chronic diarrhea. (--) hematochezia.  (--) fecal incontinence.  (--) fecal smearing/urgency.  (--) incomplete evacuation.  Not taking fiber regularly.    --has diverticulosis (with h/o diverticulitis)--trying to watch irritants (cheese, peanut butter)  --symptoms have been better-- denies diarrhea   --Patient had C. Diff and  + H. Pylori infections   --had incarcerated hernia in     4)pessary:  Denies pain or bleeding. Using #4 ring with support.     5)Patient had C. Diff and  + H. Pylori infections - followed by GI  Still has + abdominal pain and loose frequent stools.  Intermittent diarrhea alternating with constipation.         12/2016  The uterus measures 7.1 x 3.3 x 3.8 cm. Uterine parenchyma is homogeneous with evidence of a few uterine fibroids.  One appears partially calcified and measures 0.7 x 0.6 x 0.3 cm.  The largest is in the myometrium of the uterine fundus measuring 1.6 x 1.6 x 1.6 cm.  Additionally there is a new fibroid anteriorly measuring 0.9 x 0.7 x 0.9 cm. The endometrial stripe is thickened and measures 0.7 cm.    The right ovary is normal in size and measures 1.1 1.0 x 1.2 cm. The left ovary is normal in size and measures 1.1 x 0.4 x 1.2 cm. Follicles are seen in both ovaries. Arterial and venous flow are preserved  bilaterally with resistive indices of 0.7 on the right and 0.75 on the left. No free fluid is seen.   Impression      The uterus demonstrates presence of a few fibroids, 2 of which are stable in appearance, and 1 new fibroid.  There is endometrial thickening, underlying pathology cannot be excluded.  Consider further evaluation.  This report has been flagged in the [facility] medical record.      03/14/2017  embx  ATROPHIC ENDOMETRIUM.  NO HYPERPLASIA OR MALIGNANCY IDENTIFIED    09/07/2017  Suds  1.  VOIDING PHASE:       a.  Uroflowmetry:  Not performed.       b.  Pressure flow:  · Prolapse reduction: Yes (pessary)0  · Voided volume:   458 mL  · Voiding time:   507 seconds  · Peak flow:  9 mL/s   · Avg flow:  2 mL/s  · Max det pressure:  36  cm H20  · Det pressure at max flow: 29 cm H20  · Void initiated by unable to discern as pves was dislodged during study.    · Urethral relaxation (EMG):  absent.    · PVR (calculated):  0 mL     The overall configuration of this pressure flow study was prolonged with indeterminate mechanism of void.       2.  FILLING PHASE:  · 1st desire: 98 mL  · Normal desire:  214 mL  · Strong desire:  329 mL  · Urgency:  397 mL  · Compliance (calculated)  ~20 mL/cm H20  · EMG activity during filling:  stable  · Detrusor contractions observed: No.      3.  URETHRAL FUNCTION/STORAGE PHASE:     a.  WITH prolapse reduction:  · CLPP (150 mL): Negative  at  130 cm H20  · VLPP (150 mL): Negative  at  76 cm H20   · CLPP (300 mL): Positive  at  150 cm H20  · VLPP (300 mL): Positive  at  138 cm H20   · CLPP (MAX ):    Positive  at  128 cm H20  · VLPP (MAX):     Positive  at  82 cm H20     These findings are consistent with Positive urodynamic stress incontinence.     Assessment:  UF not completed.  PF prolonged with indeterminate mechanism of void.  Compliance low normal.  Max capacity 458 mL.  DO (--).  ERIKA (+).       Cysto    Findings: Urethroscopy:  Normal with mild decrease in coaptation.  Cystoscopy:   Normal bladder mucosa, bilateral ureteral flow was noted.     Assessment: Essentially normal cystourethroscopy with mild decrease in urethral coaptation    TODAY:  1)  Mixed urinary incontinence, urge > stress/prolapse:    --Empty bladder every 3 hours.  Empty well: wait a minute, lean forward on toilet.    --Avoid dietary irritants (see sheet).  Keep diary x 3-5 days to determine your irritants.  --KEGELS: do 10 in AM and 10 in PM, holding each x 10 seconds.  When you feel urge to go, STOP, KEGEL, and when urge has passed, then go to bathroom.  Consider PT in future.  --URGE: consider anticholinergic.   Takes 2-4 weeks to see if will have effect.  For dry mouth: get sour, sugar free lozenge or gum.    --STRESS:  Pessary vs. Sling. Schedule uds/cysto.   --continue use #4 ring with support pessary      2)Vaginal atrophy (dryness):  Use 1 gram of estrogen cream in vagina twice a week--use small amount with your finger around the opening as well  --trimosan-- use on days you are not using estrogen cream     3) Loose stool:  --not taking any fiber--was ok  --having some GI upset today  --avoid dietary irritants      4) RUQ pain/ liver cyst  --followed by hepatology  Hepatology Recommendations:  -  Ultrasound abd to evaluate liver cyst in 2/18  -  Return in 2/18     5)  Complete uterovaginal prolapse:  --is not ready to proceed with surgery  --continue pessary use  --would like to pursue surgical repair; not sexually active + h/o multiple bowel infections/recurrent diverticulosis; discussed increased safety profile of Lefort colpocleisis vs. Intraperitoneal procedure; she is ok with this plan  --pelvic US 12/16: normal but with EMB thickening  --pap 2012: benign; no h/o abnl paps  --EMBx 3/17: benign  --surgical plain:  D&C/hysteroscopy to address EMB thickening and LeFort colpocleisis was scheduled-- cancelled due to hernia surgery      Past Medical History:   Diagnosis Date    Arthritis     Diverticulitis      Hypertension        Past Surgical History:   Procedure Laterality Date    CHOLECYSTECTOMY      COLONOSCOPY N/A 10/5/2015    Procedure: COLONOSCOPY;  Surgeon: Perez White MD;  Location: [facility] ([address] [place] [address]);  Service: Endoscopy;  Laterality: N/A;  C diff negative per Dr White/see microbiology report dated 8/26/15/svn    HERNIA REPAIR      kidney stones removed         Current Outpatient Prescriptions   Medication Sig    amlodipine (NORVASC) 2.5 MG tablet Take 2.5 mg by mouth once daily.    aspirin-sod bicarb-citric acid (CHANO-SELTZER) 324 mg TbEF Take 325 mg by mouth once daily. Patient takes 1/4 tablet for upset stomach    atenolol (TENORMIN) 50 MG tablet Take by mouth. 1 Tablet Oral Every day    conjugated estrogens (PREMARIN) vaginal cream Place 0.5 g vaginally twice a week.    CREON 24,000-76,000 -120,000 unit capsule     FLUZONE HIGH-DOSE 2017-18, PF, 180 mcg/0.5 mL vaccine     hydrochlorothiazide (HYDRODIURIL) 25 MG tablet Take 25 mg by mouth once daily.    PREMARIN vaginal cream INSERT 1 APPLICATORFUL/DIME-SIZED AMOUNT WITH FINGER INTO VAGINA NIGHTLY X2 WEEKS,THEN TWICE WEEKLY     No current facility-administered medications for this visit.        ROS:  As per HPI.      Exam  There were no vitals taken for this visit.  General: alert and oriented, no acute distress  Remainder of PE deferred, as patient declines exam due to recent GI issues.       Impression  No diagnosis found.  We reviewed the above issues and discussed options for short-term versus long-term management of her problems.   Plan:   1)  Mixed urinary incontinence, urge > stress/prolapse:    --Empty bladder every 3 hours.  Empty well: wait a minute, lean forward on toilet.    --Avoid dietary irritants (see sheet).  Keep diary x 3-5 days to determine your irritants.  --KEGELS: do 10 in AM and 10 in PM, holding each x 10 seconds.  When you feel urge to go, STOP, KEGEL, and when urge has passed, then go to bathroom.  Consider PT  in future.  --URGE: consider anticholinergic.   Takes 2-4 weeks to see if will have effect.  For dry mouth: get sour, sugar free lozenge or gum.    --STRESS:  Pessary vs. Sling. Schedule uds/cysto.   --continue use #4 ring with support pessary      2)Vaginal atrophy (dryness):  Use 1 gram of estrogen cream in vagina twice a week--use small amount with your finger around the opening as well  --trimosan-- use on days you are not using estrogen cream     3) Loose stool:  --restart daily fiber: 2 tsps -- take 2x/day  --avoid dietary irritants     4) Continue to followup with GI     5) RUQ pain/ liver cyst  --followed by hepatology  Hepatology Recommendations:  -  Ultrasound abd to evaluate liver cyst in 2/18  -  Return in 2/18--last visit 2/17 (missed follow up)--asked MA to help her make hepatology follow up     6)  Complete uterovaginal prolapse:  --continue pessary use  --would like to pursue surgical repair; not sexually active + h/o multiple bowel infections/recurrent diverticulosis; discussed increased safety profile of Lefort colpocleisis vs. Intraperitoneal procedure; she is ok with this plan  --pelvic US 12/16: normal but with EMB thickening  --pap 2012: benign; no h/o abnl paps  --EMBx 3/17: benign  --surgical plain:  D&C/hysteroscopy to address EMB thickening and LeFort colpocleisis was scheduled-- cancelled due to hernia surgery    7) Would like surgery.    --need to call her to pick OR dates  --Lefort colpocleisis + MUS; prefers [facility]; has had to cancel before due to IBS-D  --pelvic US preop  --needs to see PCP/labs preop  --do pessary check at preop/consents--declines today due to GI issues; last check 2/18.      30 minutes were spent in face to face time with this patient  75 % of this time was spent in counseling and/or coordination of care    CHEVY Duval-BC [facility]  Division of Female Pelvic Medicine and Reconstructive Surgery  Department of Obstetrics & Gynecology

## 2018-07-09 ENCOUNTER — TELEPHONE (OUTPATIENT)
Dept: UROGYNECOLOGY | Facility: CLINIC | Age: 78
End: 2018-07-09

## 2018-07-09 NOTE — TELEPHONE ENCOUNTER
Attempted to contact pt to help her schedule a follow up appointment with hepatology to follow up her liver cyst,   and also to schedule another abdominal US before she sees them and also a  pelvic US and an appointment for pessary removal/insertion with Madisyn before her surgery on the same day. Pt did not answer.

## 2018-07-09 NOTE — TELEPHONE ENCOUNTER
----- Message from Felicia Gates MD sent at 7/8/2018 11:43 AM CDT -----  Regarding: please call patient and help her make follow up appt with hepatology  Patient was supposed to see hepatology again in 2/18 to follow up her liver cyst.  She did not do this.  Please help her make appt to see them I near future (midlevel fine if quicker to get appt).  She needs another abdominal US before she sees them.  I need to get a pelvic US before her surgery with me.  Can you please help her schedule both of these--she can do same day.  She has a pessary; so, she will need quick appt with Madisyn to remove pessary, then go to radiology to do both US's, then return to have Madisyn replace pessary. Thanks!

## 2018-07-17 ENCOUNTER — TELEPHONE (OUTPATIENT)
Dept: UROGYNECOLOGY | Facility: CLINIC | Age: 78
End: 2018-07-17

## 2018-07-17 NOTE — TELEPHONE ENCOUNTER
----- Message from Kiran Brothers sent at 7/17/2018  3:20 PM CDT -----            Name of Who is Calling:YUE CLOUD [[id number]]      What is the request in detail: Pt has a transvaginal ultrasound scheduled on 7/19. She needs to have her pessary removed prior to that, but no appts are available. Please call to discuss.      Can the clinic reply by [facility]: no      What Number to Call Back if not in [facility]: [phone]

## 2018-07-17 NOTE — TELEPHONE ENCOUNTER
Spoke to pt, appointment for pessary removal for US scheduled at 2:30p. The pt was instructed to come in for 10a and have her pessary removed for her 10:30a US appointment and come back up after for reinsertion. Pt verbalizes understanding.

## 2018-07-18 ENCOUNTER — TELEPHONE (OUTPATIENT)
Dept: GASTROENTEROLOGY | Facility: CLINIC | Age: 78
End: 2018-07-18

## 2018-07-18 NOTE — TELEPHONE ENCOUNTER
----- Message from Ora Bryant MA sent at 7/18/2018 11:10 AM CDT -----  Contact: self  [phone]  Patient Requesting Sooner Appointment.     Reason for sooner appt.:  I need an appointment soon due to intestinal problems  When is the first available appointment?  I have nothing through December 2018  Communication Preference:  Phone# above  Additional Information:  The recorded message states if you need an appointment today, well I don't expect one today but at least by Thursday, 7-19-18

## 2018-07-20 ENCOUNTER — TELEPHONE (OUTPATIENT)
Dept: UROGYNECOLOGY | Facility: CLINIC | Age: 78
End: 2018-07-20

## 2018-07-20 NOTE — TELEPHONE ENCOUNTER
Attempted to schedule patient for surgery. She wants to wait until she sees her gastroenterologist in August.  CHEVY Duval-BC

## 2018-07-26 ENCOUNTER — TELEPHONE (OUTPATIENT)
Dept: UROGYNECOLOGY | Facility: CLINIC | Age: 78
End: 2018-07-26

## 2018-07-26 NOTE — TELEPHONE ENCOUNTER
----- Message from Rufino Delgado sent at 7/26/2018 10:15 AM CDT -----  PLEASE CALL PT SHE HAS A APPT WITH YOU ON 08/07 AND 2  U/S APPT ONE IS FASTING AND THE OTHER IS FULL BLADDER [phone]

## 2018-07-26 NOTE — TELEPHONE ENCOUNTER
Spoke with pt who wanted to change the appointment date or time of her scheduled u/s because she can not go the whole morning with out eating, pt also has a pessary so she would need to see NP Keyla prior to setting scanned. Pt only wants to go to [facility] and they had know available morning slot to accomodates both u/s plus doctor's appointment for pessary removal, Pt then states that she did not understand why she was getting u/s done when she has just has a MRI with her PCP, Pt states she will drop off results next Tuesday for  for review to determine if it is still necessary pt voiced understanding and call was ended.

## 2018-07-31 ENCOUNTER — PATIENT MESSAGE (OUTPATIENT)
Dept: UROGYNECOLOGY | Facility: CLINIC | Age: 78
End: 2018-07-31

## 2018-08-14 ENCOUNTER — OFFICE VISIT (OUTPATIENT)
Dept: UROGYNECOLOGY | Facility: CLINIC | Age: 78
End: 2018-08-14
Payer: MEDICARE

## 2018-08-14 ENCOUNTER — HOSPITAL ENCOUNTER (OUTPATIENT)
Dept: RADIOLOGY | Facility: HOSPITAL | Age: 78
Discharge: HOME | End: 2018-08-14
Attending: OBSTETRICS & GYNECOLOGY
Payer: MEDICARE

## 2018-08-14 VITALS
WEIGHT: 137.56 LBS | DIASTOLIC BLOOD PRESSURE: 60 MMHG | HEIGHT: 62 IN | BODY MASS INDEX: 25.32 KG/M2 | SYSTOLIC BLOOD PRESSURE: 120 MMHG

## 2018-08-14 DIAGNOSIS — N39.46 MIXED STRESS AND URGE URINARY INCONTINENCE: ICD-10-CM

## 2018-08-14 DIAGNOSIS — N95.2 VAGINAL ATROPHY: ICD-10-CM

## 2018-08-14 DIAGNOSIS — N81.11 MIDLINE CYSTOCELE: ICD-10-CM

## 2018-08-14 DIAGNOSIS — R93.89 ABNORMAL PELVIC ULTRASOUND: ICD-10-CM

## 2018-08-14 DIAGNOSIS — N81.4 UTERINE PROLAPSE: Primary | ICD-10-CM

## 2018-08-14 DIAGNOSIS — N81.6 RECTOCELE: ICD-10-CM

## 2018-08-14 PROCEDURE — 99999 PR PBB SHADOW E&M-EST. PATIENT-LVL III: CPT | Mod: PBBFAC,,, | Performed by: NURSE PRACTITIONER

## 2018-08-14 PROCEDURE — 76856 US EXAM PELVIC COMPLETE: CPT | Mod: 26,,, | Performed by: RADIOLOGY

## 2018-08-14 PROCEDURE — 99213 OFFICE O/P EST LOW 20 MIN: CPT | Mod: S$GLB,,, | Performed by: NURSE PRACTITIONER

## 2018-08-14 PROCEDURE — 76830 TRANSVAGINAL US NON-OB: CPT | Mod: 26,,, | Performed by: RADIOLOGY

## 2018-08-14 PROCEDURE — 76856 US EXAM PELVIC COMPLETE: CPT | Mod: TC

## 2018-08-14 NOTE — PROGRESS NOTES
Urogyn follow up  02/27/2018  .  [facility]  [address]  [facility] [address]    Linda Felton  [id number]  1940      Linda Felton is a 78 y.o. here for a urogyn follow up for pessary care and to discuss surgery.    1)  UI:  (--) ERIKA   (+) UUI    (+) pads:3/day, usually moderate wetness.  Daytime frequency: Q 3 hours.  Nocturia: NO.   (--) dysuria,  (--) hematuria,  (--) frequent UTIs.  (+) complete bladder emptying.      2)  POP:  Present. below introitus.  Symptoms:(--)    (--) vaginal bleeding. (+) vaginal discharge- scant pink discharge (--) sexually active.  (--)  Vaginal dryness.  (+) vaginal estrogen use.      3)  BM:  (--) constipation/straining.  (--) chronic diarrhea. (--) hematochezia.  (--) fecal incontinence.  (--) fecal smearing/urgency.  (--) incomplete evacuation.  Not taking fiber regularly.    --has diverticulosis (with h/o diverticulitis)--trying to watch irritants (cheese, peanut butter)  --symptoms have been better-- denies diarrhea   --Patient had C. Diff and  + H. Pylori infections   --had incarcerated hernia in     4)pessary:  Denies pain or bleeding. Using #4 ring with support.     5)Patient had C. Diff and  + H. Pylori infections - followed by GI  Still has + abdominal pain and loose frequent stools.  Intermittent diarrhea alternating with constipation.         12/2016  The uterus measures 7.1 x 3.3 x 3.8 cm. Uterine parenchyma is homogeneous with evidence of a few uterine fibroids.  One appears partially calcified and measures 0.7 x 0.6 x 0.3 cm.  The largest is in the myometrium of the uterine fundus measuring 1.6 x 1.6 x 1.6 cm.  Additionally there is a new fibroid anteriorly measuring 0.9 x 0.7 x 0.9 cm. The endometrial stripe is thickened and measures 0.7 cm.    The right ovary is normal in size and measures 1.1 1.0 x 1.2 cm. The left ovary is normal in size and measures 1.1 x 0.4 x 1.2 cm. Follicles are seen in both ovaries. Arterial and venous  flow are preserved bilaterally with resistive indices of 0.7 on the right and 0.75 on the left. No free fluid is seen.   Impression      The uterus demonstrates presence of a few fibroids, 2 of which are stable in appearance, and 1 new fibroid.  There is endometrial thickening, underlying pathology cannot be excluded.  Consider further evaluation.  This report has been flagged in the [facility] medical record.      03/14/2017  embx  ATROPHIC ENDOMETRIUM.  NO HYPERPLASIA OR MALIGNANCY IDENTIFIED    09/07/2017  Suds  1.  VOIDING PHASE:       a.  Uroflowmetry:  Not performed.       b.  Pressure flow:  · Prolapse reduction: Yes (pessary)0  · Voided volume:   458 mL  · Voiding time:   507 seconds  · Peak flow:  9 mL/s   · Avg flow:  2 mL/s  · Max det pressure:  36  cm H20  · Det pressure at max flow: 29 cm H20  · Void initiated by unable to discern as pves was dislodged during study.    · Urethral relaxation (EMG):  absent.    · PVR (calculated):  0 mL     The overall configuration of this pressure flow study was prolonged with indeterminate mechanism of void.       2.  FILLING PHASE:  · 1st desire: 98 mL  · Normal desire:  214 mL  · Strong desire:  329 mL  · Urgency:  397 mL  · Compliance (calculated)  ~20 mL/cm H20  · EMG activity during filling:  stable  · Detrusor contractions observed: No.      3.  URETHRAL FUNCTION/STORAGE PHASE:     a.  WITH prolapse reduction:  · CLPP (150 mL): Negative  at  130 cm H20  · VLPP (150 mL): Negative  at  76 cm H20   · CLPP (300 mL): Positive  at  150 cm H20  · VLPP (300 mL): Positive  at  138 cm H20   · CLPP (MAX ):    Positive  at  128 cm H20  · VLPP (MAX):     Positive  at  82 cm H20     These findings are consistent with Positive urodynamic stress incontinence.     Assessment:  UF not completed.  PF prolonged with indeterminate mechanism of void.  Compliance low normal.  Max capacity 458 mL.  DO (--).  ERIKA (+).       Cysto    Findings: Urethroscopy:  Normal with mild decrease in  coaptation.  Cystoscopy:  Normal bladder mucosa, bilateral ureteral flow was noted.     Assessment: Essentially normal cystourethroscopy with mild decrease in urethral coaptation    TODAY:  1)  Mixed urinary incontinence, urge > stress/prolapse:    --no increase in UUI/ ERIKA.  Using  #4 ring with support pessary      2)Vaginal atrophy (dryness):    --using esrogen cream and trimosan     3) Loose stool:  --having loose stools today      4) RUQ pain/ liver cyst  --followed by hepatology     5)  Complete uterovaginal prolapse:  --ready to schedule surgery  --continue pessary use  --would like to pursue surgical repair; not sexually active + h/o multiple bowel infections/recurrent diverticulosis; discussed increased safety profile of Lefort colpocleisis vs. Intraperitoneal procedure; she is ok with this plan  --pelvic US 12/16: normal but with EMB thickening  --pap 2012: benign; no h/o abnl paps  --EMBx 3/17: benign  --surgical plain:  D&C/hysteroscopy to address EMB thickening and LeFort colpocleisis was scheduled-- cancelled due to hernia surgery      Past Medical History:   Diagnosis Date    Arthritis     Diverticulitis     Hypertension        Past Surgical History:   Procedure Laterality Date    CHOLECYSTECTOMY      HERNIA REPAIR      kidney stones removed         Current Outpatient Medications   Medication Sig    amlodipine (NORVASC) 2.5 MG tablet Take 2.5 mg by mouth once daily.    aspirin-sod bicarb-citric acid (CHANO-SELTZER) 324 mg TbEF Take 325 mg by mouth once daily. Patient takes 1/4 tablet for upset stomach    atenolol (TENORMIN) 50 MG tablet Take by mouth. 1 Tablet Oral Every day    conjugated estrogens (PREMARIN) vaginal cream Place 0.5 g vaginally twice a week.    hydrochlorothiazide (HYDRODIURIL) 25 MG tablet Take 25 mg by mouth once daily.    CREON 24,000-76,000 -120,000 unit capsule     FLUZONE HIGH-DOSE 2017-18, PF, 180 mcg/0.5 mL vaccine     meloxicam (MOBIC) 15 MG tablet Take 15 mg by  "mouth once daily.     No current facility-administered medications for this visit.        ROS:  As per HPI.      Exam  /60 (BP Location: Right arm, Patient Position: Sitting, BP Method: Medium (Manual))   Ht 5' 2" (1.575 m)   Wt 62.4 kg (137 lb 9.1 oz)   BMI 25.16 kg/m²   General: alert and oriented, no acute distress  Remainder of PE deferred, as patient declines exam due to recent GI issues.       Impression  1. Uterine prolapse     2. Midline cystocele     3. Rectocele     4. Mixed stress and urge urinary incontinence     5. Vaginal atrophy       We reviewed the above issues and discussed options for short-term versus long-term management of her problems.   Plan:   1)  Mixed urinary incontinence, urge > stress/prolapse:    --Empty bladder every 3 hours.  Empty well: wait a minute, lean forward on toilet.    --Avoid dietary irritants (see sheet).  Keep diary x 3-5 days to determine your irritants.  --KEGELS: do 10 in AM and 10 in PM, holding each x 10 seconds.  When you feel urge to go, STOP, KEGEL, and when urge has passed, then go to bathroom.  Consider PT in future.  --URGE: consider anticholinergic.   Takes 2-4 weeks to see if will have effect.  For dry mouth: get sour, sugar free lozenge or gum.    --STRESS:  Pessary vs. Sling. Schedule uds/cysto.   --continue use #4 ring with support pessary      2)Vaginal atrophy (dryness):  Use 1 gram of estrogen cream in vagina twice a week--use small amount with your finger around the opening as well  --trimosan-- use on days you are not using estrogen cream     3) Loose stool:  --restart daily fiber: 2 tsps -- take 2x/day  --avoid dietary irritants     4) Continue to followup with GI     5) RUQ pain/ liver cyst  --followed by hepatology  Hepatology Recommendations:  -  Ultrasound abd to evaluate liver cyst in 2/18  -  Return in 2/18--last visit 2/17 (missed follow up)--asked MA to help her make hepatology follow up     6)  Complete uterovaginal " prolapse:  --continue pessary use  --would like to pursue surgical repair; not sexually active + h/o multiple bowel infections/recurrent diverticulosis; discussed increased safety profile of Lefort colpocleisis vs. Intraperitoneal procedure; she is ok with this plan  --pelvic US 12/16: normal but with EMB thickening  --pap 2012: benign; no h/o abnl paps  --EMBx 3/17: benign  --surgical plain:  D&C/hysteroscopy to address EMB thickening and LeFort colpocleisis was scheduled-- cancelled due to hernia surgery    7) Would like surgery.    --need to call her to pick OR dates  --Lefort colpocleisis + MUS; prefers [facility]; has had to cancel before due to IBS-D  --pelvic US preop  --needs to see PCP/labs preop  --do pessary check at preop/consents--declines today due to GI issues; last check 2/18.      30 minutes were spent in face to face time with this patient  75 % of this time was spent in counseling and/or coordination of care    Madisyn Ramires, CHEVY-BC [facility]  Division of Female Pelvic Medicine and Reconstructive Surgery  Department of Obstetrics & Gynecology

## 2018-08-21 DIAGNOSIS — Z01.818 PREOP TESTING: ICD-10-CM

## 2018-08-21 DIAGNOSIS — N39.46 MIXED STRESS AND URGE URINARY INCONTINENCE: ICD-10-CM

## 2018-08-21 DIAGNOSIS — N81.2 INCOMPLETE UTEROVAGINAL PROLAPSE: Primary | ICD-10-CM

## 2018-08-23 ENCOUNTER — OFFICE VISIT (OUTPATIENT)
Dept: GASTROENTEROLOGY | Facility: CLINIC | Age: 78
End: 2018-08-23
Payer: MEDICARE

## 2018-08-23 VITALS
SYSTOLIC BLOOD PRESSURE: 134 MMHG | WEIGHT: 137.38 LBS | DIASTOLIC BLOOD PRESSURE: 72 MMHG | HEIGHT: 62 IN | BODY MASS INDEX: 25.28 KG/M2 | HEART RATE: 64 BPM

## 2018-08-23 DIAGNOSIS — K29.70 HELICOBACTER PYLORI GASTRITIS: Primary | ICD-10-CM

## 2018-08-23 DIAGNOSIS — I70.0 ATHEROSCLEROSIS OF ABDOMINAL AORTA: ICD-10-CM

## 2018-08-23 DIAGNOSIS — K76.89 LIVER CYST: ICD-10-CM

## 2018-08-23 DIAGNOSIS — E89.40 POSTSURGICAL MENOPAUSE: ICD-10-CM

## 2018-08-23 DIAGNOSIS — Z13.820 OSTEOPOROSIS SCREENING: ICD-10-CM

## 2018-08-23 DIAGNOSIS — E55.9 VITAMIN D DEFICIENCY: ICD-10-CM

## 2018-08-23 DIAGNOSIS — B96.81 HELICOBACTER PYLORI GASTRITIS: Primary | ICD-10-CM

## 2018-08-23 PROCEDURE — 99999 PR PBB SHADOW E&M-EST. PATIENT-LVL III: CPT | Mod: PBBFAC,,, | Performed by: INTERNAL MEDICINE

## 2018-08-23 PROCEDURE — 99214 OFFICE O/P EST MOD 30 MIN: CPT | Mod: S$GLB,,, | Performed by: INTERNAL MEDICINE

## 2018-08-23 NOTE — PROGRESS NOTES
CHIEF COMPLAINT:  Follow up of liver cyst.    HISTORY OF PRESENT ILLNESS:  This is a 78-year-old female who is originally from   [place].  She came to [place] in 1964.  She speaks English very well.    She is the aunt of Dr. Rose Franklin's wife.  Dr. Franklin is the head of the ER   system here.  She has a history of a liver cyst, which she is followed by   Hepatology, but got lost to followup at Hepatology.  She came to see if we could   get her back in to Hepatology Clinic.  She follows up with Dr. Shonda Curtis.    She is past due for a yearly followup, which should have been in February of   this year.  The patient's hepatitis C is negative.  The patient also has some   abdominal atherosclerotic disease as well.  She has H. pylori, but she did not   originally want treatment for H. pylori because she has C. diff in the past.    She is willing if she still has H. pylori to consider it, but she would like a   stool for H. pylori antigen first.  She has not been on any antibiotics.  She   has not been on any PPIs or H2 blockers.    REVIEW OF SYSTEMS:  CONSTITUTIONAL:  No fever, fatigue or weight loss.  ENT:  No difficulty swallowing or sore throat.  CARDIOVASCULAR:  No chest pain or palpitations.  RESPIRATORY:  No shortness of breath or cough.  GENITOURINARY:  No dysuria, urgency or frequency.  MUSCULOSKELETAL:  Some arthritis.  SKIN:  No rash.  NEUROLOGIC:  No headache, syncope or stroke.  PSYCHIATRIC:  No uncontrolled depression or anxiety.  ENDOCRINE:  No cold or heat intolerance.  GASTROINTESTINAL:  No nausea or vomiting, no heartburn, no abdominal pain, no   diarrhea, no constipation, no blood in her stool.    RISK FACTORS FOR LIVER DISEASE:  She does not drink alcohol.  No tattoos.  No   blood transfusion.  No nasal drug use.    PAST SURGICAL HISTORY:  She is planning a hysterectomy and possible   oophorectomy.  She has had her gallbladder out.    FAMILY HISTORY:  No cancers less than 60.  Nobody with  colon cancer.  No FAP, no   attenuated FAP, no MAP, no GI malignancies.    SOCIAL HISTORY:  Nonsmoker, nondrinker.  She retired about 23 years ago.  She is   on her first marriage for 49 years.  Her  is retired, used to work for   [employer] in the Home [employer].  She has one healthy daughter who is 46 years of   age.    PHYSICAL EXAMINATION:  With chaperone in the room, Ashley,  VITAL SIGNS:  Blood pressure is 134/72, pulse 64 and regular, 5 feet 2 inches   tall, 137 pounds, BMI is 25.12.  GENERAL:  Alert and oriented x4, not in any acute distress.  ABDOMEN:  Soft, no guarding, no rebound.  No tenderness.  No palpable   organomegaly.  No bruits.  No pulsatile masses.  No stigmata of chronic liver   disease.  No appreciative ascites or hernias.  Normoactive bowel sounds.  CARDIOVASCULAR:  S1 and S2 without murmurs, gallops or rubs.  RESPIRATORY:  Clear to auscultation bilaterally without wheezes, rhonchi or   rales.  SKIN:  No petechiae or rash on exposed skin areas.  NEUROLOGIC:  Alert and oriented x4.  PSYCHIATRIC:  Normal speech, mentation and affect.    MEDICAL DECISION MAKING:  As above.  Prior labs have been reviewed.  Also, CT   images have been reviewed.  She brought in a CT image from about a year ago at   an outlying facility.  It looks like her liver cyst has grown to 12 cm.    IMPRESSION AND PLAN:  1.  12 cm liver cyst on a recent outside imaging study.  We will recommend she   follow up with Dr. Curtis in Hepatology for further evaluation.  2.  History of H. pylori in the stomach.  She was not interested in being   treated for H. pylori due to the potential of getting C. diff again.  She is now   wanting to reconsider it, but she still is not committed to it.  She will do a   stool for H. pylori antigen off PPIs, H2 blockers and antibiotics for the   appropriate amount of time.  3.  Status post cholecystectomy.  4.  Colonoscopy is up-to-date.  Last colonoscopy was 10/05/2015.  Recommend   repeat in  five years, which should be October 2020.  5.  History of atherosclerotic disease on CT.  We will get a mesenteric Doppler   ultrasound, vascular study.  Return to GI Clinic in three months for followup.      SEC/HN  dd: 08/23/2018 15:50:32 (CDT)  td: 08/24/2018 06:28:41 (CDT)  Doc ID   #[id number]  Job ID #[id number]    CC:

## 2018-08-24 ENCOUNTER — TELEPHONE (OUTPATIENT)
Dept: UROGYNECOLOGY | Facility: CLINIC | Age: 78
End: 2018-08-24

## 2018-08-24 NOTE — TELEPHONE ENCOUNTER
Returned pt call no answer, Left voice message for pt to give the office a call back at [phone].

## 2018-08-24 NOTE — TELEPHONE ENCOUNTER
----- Message from Brii Ibrahim sent at 8/24/2018 11:12 AM CDT -----  Contact: YUE CLOUD [[id number]]            Name of Who is Calling: YUE CLOUD [[id number]]      What is the request in detail: Patient would like a later time for appt on 08/28 . Please call     Can the clinic reply by [facility]: no    [facility] [phone]

## 2018-08-27 NOTE — PATIENT INSTRUCTIONS
1)  Mixed urinary incontinence, urge > stress/prolapse:    --Empty bladder every 3 hours.  Empty well: wait a minute, lean forward on toilet.    --Avoid dietary irritants (see sheet).  Keep diary x 3-5 days to determine your irritants.  --KEGELS: do 10 in AM and 10 in PM, holding each x 10 seconds.  When you feel urge to go, STOP, KEGEL, and when urge has passed, then go to bathroom.  Consider PT in future.  --URGE: consider anticholinergic.   Takes 2-4 weeks to see if will have effect.  For dry mouth: get sour, sugar free lozenge or gum.    --STRESS:  Pessary vs. Sling. Schedule uds/cysto.   --continue use #4 ring with support pessary      2)Vaginal atrophy (dryness):  Use 1 gram of estrogen cream in vagina twice a week--use small amount with your finger around the opening as well  --trimosan-- use on days you are not using estrogen cream     3) Loose stool:  --restart daily fiber: 2 tsps -- take 2x/day  --avoid dietary irritants     4) Continue to followup with GI     5) RUQ pain/ liver cyst  --followed by hepatology  Hepatology Recommendations:  -  Ultrasound abd to evaluate liver cyst in 2/18  -  Return in 2/18--last visit 2/17 (missed follow up)--asked MA to help her make hepatology follow up     6)  Complete uterovaginal prolapse:  --continue pessary use  --would like to pursue surgical repair; not sexually active + h/o multiple bowel infections/recurrent diverticulosis; discussed increased safety profile of Lefort colpocleisis vs. Intraperitoneal procedure; she is ok with this plan  --pelvic US 12/16: normal but with EMB thickening  --pap 2012: benign; no h/o abnl paps  --EMBx 3/17: benign  --surgical plain:  D&C/hysteroscopy to address EMB thickening and LeFort colpocleisis was scheduled-- cancelled due to hernia surgery    7) Would like surgery.    --need to call her to pick OR dates  --Lefort colpocleisis + MUS; prefers [facility]; has had to cancel before due to IBS-D  --pelvic US preop  --needs to see  PCP/labs preop  --do pessary check at preop/consents--declines today due to GI issues; last check 2/18.

## 2018-08-28 ENCOUNTER — OFFICE VISIT (OUTPATIENT)
Dept: UROGYNECOLOGY | Facility: CLINIC | Age: 78
End: 2018-08-28
Payer: MEDICARE

## 2018-08-28 VITALS
SYSTOLIC BLOOD PRESSURE: 120 MMHG | DIASTOLIC BLOOD PRESSURE: 70 MMHG | BODY MASS INDEX: 25.64 KG/M2 | WEIGHT: 139.31 LBS | HEIGHT: 62 IN

## 2018-08-28 DIAGNOSIS — N81.11 MIDLINE CYSTOCELE: ICD-10-CM

## 2018-08-28 DIAGNOSIS — N39.46 MIXED STRESS AND URGE URINARY INCONTINENCE: ICD-10-CM

## 2018-08-28 DIAGNOSIS — Z01.818 PREOPERATIVE EXAM FOR GYNECOLOGIC SURGERY: Primary | ICD-10-CM

## 2018-08-28 DIAGNOSIS — N81.6 RECTOCELE: ICD-10-CM

## 2018-08-28 DIAGNOSIS — N81.4 UTERINE PROLAPSE: ICD-10-CM

## 2018-08-28 DIAGNOSIS — N95.2 VAGINAL ATROPHY: ICD-10-CM

## 2018-08-28 PROCEDURE — 99499 UNLISTED E&M SERVICE: CPT | Mod: S$GLB,,, | Performed by: NURSE PRACTITIONER

## 2018-08-28 PROCEDURE — 99999 PR PBB SHADOW E&M-EST. PATIENT-LVL III: CPT | Mod: PBBFAC,,, | Performed by: NURSE PRACTITIONER

## 2018-08-28 RX ORDER — DICYCLOMINE HYDROCHLORIDE 10 MG/1
CAPSULE ORAL
COMMUNITY
Start: 2018-08-18 | End: 2019-01-08

## 2018-08-28 NOTE — PROGRESS NOTES
Urogyn follow up  08/28/2018  .  [facility]  [address]  [facility] [address]    Linda Felton  [id number]  1940      Linda Felton is a 78 y.o. here for a urogyn follow up to discuss surgery.    1)  UI:  (--) ERIKA   (+) UUI    (+) pads:3/day, usually moderate wetness.  Daytime frequency: Q 3 hours.  Nocturia: NO.   (--) dysuria,  (--) hematuria,  (--) frequent UTIs.  (+) complete bladder emptying.      2)  POP:  Present. below introitus.  Symptoms:(--)    (--) vaginal bleeding. (+) vaginal discharge- scant pink discharge (--) sexually active.  (--)  Vaginal dryness.  (+) vaginal estrogen use.      3)  BM:  (--) constipation/straining.  (--) chronic diarrhea. (--) hematochezia.  (--) fecal incontinence.  (--) fecal smearing/urgency.  (--) incomplete evacuation.  Not taking fiber regularly.    --has diverticulosis (with h/o diverticulitis)--trying to watch irritants (cheese, peanut butter)  --symptoms have been better-- denies diarrhea   --Patient had C. Diff and  + H. Pylori infections   --had incarcerated hernia in     4)pessary:  Denies pain or bleeding. Using #4 ring with support.     5)Patient had C. Diff and  + H. Pylori infections - followed by GI  Still has + abdominal pain and loose frequent stools.  Intermittent diarrhea alternating with constipation.         12/2016  The uterus measures 7.1 x 3.3 x 3.8 cm. Uterine parenchyma is homogeneous with evidence of a few uterine fibroids.  One appears partially calcified and measures 0.7 x 0.6 x 0.3 cm.  The largest is in the myometrium of the uterine fundus measuring 1.6 x 1.6 x 1.6 cm.  Additionally there is a new fibroid anteriorly measuring 0.9 x 0.7 x 0.9 cm. The endometrial stripe is thickened and measures 0.7 cm.    The right ovary is normal in size and measures 1.1 1.0 x 1.2 cm. The left ovary is normal in size and measures 1.1 x 0.4 x 1.2 cm. Follicles are seen in both ovaries. Arterial and venous flow are preserved  bilaterally with resistive indices of 0.7 on the right and 0.75 on the left. No free fluid is seen.   Impression      The uterus demonstrates presence of a few fibroids, 2 of which are stable in appearance, and 1 new fibroid.  There is endometrial thickening, underlying pathology cannot be excluded.  Consider further evaluation.  This report has been flagged in the [facility] medical record.      03/14/2017  embx  ATROPHIC ENDOMETRIUM.  NO HYPERPLASIA OR MALIGNANCY IDENTIFIED    09/07/2017  Suds  1.  VOIDING PHASE:       a.  Uroflowmetry:  Not performed.       b.  Pressure flow:  · Prolapse reduction: Yes (pessary)0  · Voided volume:   458 mL  · Voiding time:   507 seconds  · Peak flow:  9 mL/s   · Avg flow:  2 mL/s  · Max det pressure:  36  cm H20  · Det pressure at max flow: 29 cm H20  · Void initiated by unable to discern as pves was dislodged during study.    · Urethral relaxation (EMG):  absent.    · PVR (calculated):  0 mL     The overall configuration of this pressure flow study was prolonged with indeterminate mechanism of void.       2.  FILLING PHASE:  · 1st desire: 98 mL  · Normal desire:  214 mL  · Strong desire:  329 mL  · Urgency:  397 mL  · Compliance (calculated)  ~20 mL/cm H20  · EMG activity during filling:  stable  · Detrusor contractions observed: No.      3.  URETHRAL FUNCTION/STORAGE PHASE:     a.  WITH prolapse reduction:  · CLPP (150 mL): Negative  at  130 cm H20  · VLPP (150 mL): Negative  at  76 cm H20   · CLPP (300 mL): Positive  at  150 cm H20  · VLPP (300 mL): Positive  at  138 cm H20   · CLPP (MAX ):    Positive  at  128 cm H20  · VLPP (MAX):     Positive  at  82 cm H20     These findings are consistent with Positive urodynamic stress incontinence.     Assessment:  UF not completed.  PF prolonged with indeterminate mechanism of void.  Compliance low normal.  Max capacity 458 mL.  DO (--).  ERIKA (+).       Cysto    Findings: Urethroscopy:  Normal with mild decrease in coaptation.  Cystoscopy:   Normal bladder mucosa, bilateral ureteral flow was noted.     Assessment: Essentially normal cystourethroscopy with mild decrease in urethral coaptation    TODAY:  1)  Mixed urinary incontinence, urge > stress/prolapse:    --no increase in UUI/ ERIKA.  Using  #4 ring with support pessary      2)Vaginal atrophy (dryness):    --using esrogen cream and trimosan     3) Loose stool:  --imporved today  --does have intermittent lower abdominal cramping       4) RUQ pain/ liver cyst  --followed by hepatology     5)  Complete uterovaginal prolapse:  --ready to schedule surgery  --continue pessary use  --would like to pursue surgical repair; not sexually active + h/o multiple bowel infections/recurrent diverticulosis; discussed increased safety profile of Lefort colpocleisis vs. Intraperitoneal procedure; she is ok with this plan  --pelvic US 12/16: normal but with EMB thickening  --pap 2012: benign; no h/o abnl paps  --EMBx 3/17: benign  --surgical plain:  D&C/hysteroscopy to address EMB thickening and LeFort colpocleisis was scheduled-- cancelled due to hernia surgery      Past Medical History:   Diagnosis Date    Arthritis     Diverticulitis     Hypertension        Past Surgical History:   Procedure Laterality Date    CHOLECYSTECTOMY      HERNIA REPAIR      kidney stones removed         Current Outpatient Medications   Medication Sig    amlodipine (NORVASC) 2.5 MG tablet Take 2.5 mg by mouth once daily.    aspirin-sod bicarb-citric acid (CHANO-SELTZER) 324 mg TbEF Take 325 mg by mouth once daily. Patient takes 1/4 tablet for upset stomach    atenolol (TENORMIN) 50 MG tablet Take by mouth. 1 Tablet Oral Every day    conjugated estrogens (PREMARIN) vaginal cream Place 0.5 g vaginally twice a week.    CREON 24,000-76,000 -120,000 unit capsule     dicyclomine (BENTYL) 10 MG capsule     FLUZONE HIGH-DOSE 2017-18, PF, 180 mcg/0.5 mL vaccine     hydrochlorothiazide (HYDRODIURIL) 25 MG tablet Take 25 mg by mouth once  "daily.    meloxicam (MOBIC) 15 MG tablet Take 15 mg by mouth once daily.     No current facility-administered medications for this visit.        ROS:  As per HPI.      Exam  /70 (BP Location: Right arm, Patient Position: Sitting, BP Method: Medium (Manual))   Ht 5' 2" (1.575 m)   Wt 63.2 kg (139 lb 5.3 oz)   BMI 25.48 kg/m²   General: alert and oriented, no acute distress  Remainder of PE deferred, as patient declines exam due to recent GI issues.       Impression  1. Uterine prolapse     2. Midline cystocele     3. Rectocele     4. Vaginal atrophy     5. Mixed stress and urge urinary incontinence       We reviewed the above issues and discussed options for short-term versus long-term management of her problems.   Plan:   1. Patient consented with Dr. Gates for hysteroscopic D&C, lefort colpocleisis, possible anterior/posterior repair with perineorrhaphy, possible laparotomy, placement of synthetic midurethral sling, and cystourethroscopy.   R/B/A reviewed. Specific risks reviewed include:  infection, bleeding, need for blood transfusion, damage to surrounding structures, anesthesia risks, death, heart attack, stroke, mesh erosion/extrusion, pain, dyspareunia, urinary retention, voiding dysfunction, urinary incontinence, exacerbation of urinary urge incontinence, and need for further surgeries.  We reviewed potential for failure of POP defect repair and need for future surgery, with no way of predicting risk.  She understands success rate of midurethral sling for ERIKA was reviewed as 80-85%, and she understands that this will not necessarily impact other types of urinary incontinence.  Alternatives reviewed include: pessary/PT for POP and pessary/periurethral injections/PT/medication for ERIKA.    2. T&S, urine HCG on DOS  3. Preoperative appointment with PCP or cardiology: Yes --pending  4. VTE Prophylaxis:  heparin 5000 u SQ TID (1st dose 2hrs preop) + SCDs  5. Patient instructed on Magnesium citrate and " chlorahexadine/dial soap prep to perform day before & AM of surgery.   6. Proceed to OR for above-mentioned procedure.      30 minutes were spent in face to face time with this patient  75 % of this time was spent in counseling and/or coordination of care    CHEVY Duval-BC [facility]  Division of Female Pelvic Medicine and Reconstructive Surgery  Department of Obstetrics & Gynecology

## 2018-08-28 NOTE — H&P (VIEW-ONLY)
Urogyn follow up  08/28/2018  .  [facility]  [address]  [facility] [address]    Linda Felton  [id number]  1940      Linda Felton is a 78 y.o. here for a urogyn follow up to discuss surgery.    1)  UI:  (--) ERIKA   (+) UUI    (+) pads:3/day, usually moderate wetness.  Daytime frequency: Q 3 hours.  Nocturia: NO.   (--) dysuria,  (--) hematuria,  (--) frequent UTIs.  (+) complete bladder emptying.      2)  POP:  Present. below introitus.  Symptoms:(--)    (--) vaginal bleeding. (+) vaginal discharge- scant pink discharge (--) sexually active.  (--)  Vaginal dryness.  (+) vaginal estrogen use.      3)  BM:  (--) constipation/straining.  (--) chronic diarrhea. (--) hematochezia.  (--) fecal incontinence.  (--) fecal smearing/urgency.  (--) incomplete evacuation.  Not taking fiber regularly.    --has diverticulosis (with h/o diverticulitis)--trying to watch irritants (cheese, peanut butter)  --symptoms have been better-- denies diarrhea   --Patient had C. Diff and  + H. Pylori infections   --had incarcerated hernia in     4)pessary:  Denies pain or bleeding. Using #4 ring with support.     5)Patient had C. Diff and  + H. Pylori infections - followed by GI  Still has + abdominal pain and loose frequent stools.  Intermittent diarrhea alternating with constipation.         12/2016  The uterus measures 7.1 x 3.3 x 3.8 cm. Uterine parenchyma is homogeneous with evidence of a few uterine fibroids.  One appears partially calcified and measures 0.7 x 0.6 x 0.3 cm.  The largest is in the myometrium of the uterine fundus measuring 1.6 x 1.6 x 1.6 cm.  Additionally there is a new fibroid anteriorly measuring 0.9 x 0.7 x 0.9 cm. The endometrial stripe is thickened and measures 0.7 cm.    The right ovary is normal in size and measures 1.1 1.0 x 1.2 cm. The left ovary is normal in size and measures 1.1 x 0.4 x 1.2 cm. Follicles are seen in both ovaries. Arterial and venous flow are preserved  bilaterally with resistive indices of 0.7 on the right and 0.75 on the left. No free fluid is seen.   Impression      The uterus demonstrates presence of a few fibroids, 2 of which are stable in appearance, and 1 new fibroid.  There is endometrial thickening, underlying pathology cannot be excluded.  Consider further evaluation.  This report has been flagged in the [facility] medical record.      03/14/2017  embx  ATROPHIC ENDOMETRIUM.  NO HYPERPLASIA OR MALIGNANCY IDENTIFIED    09/07/2017  Suds  1.  VOIDING PHASE:       a.  Uroflowmetry:  Not performed.       b.  Pressure flow:  · Prolapse reduction: Yes (pessary)0  · Voided volume:   458 mL  · Voiding time:   507 seconds  · Peak flow:  9 mL/s   · Avg flow:  2 mL/s  · Max det pressure:  36  cm H20  · Det pressure at max flow: 29 cm H20  · Void initiated by unable to discern as pves was dislodged during study.    · Urethral relaxation (EMG):  absent.    · PVR (calculated):  0 mL     The overall configuration of this pressure flow study was prolonged with indeterminate mechanism of void.       2.  FILLING PHASE:  · 1st desire: 98 mL  · Normal desire:  214 mL  · Strong desire:  329 mL  · Urgency:  397 mL  · Compliance (calculated)  ~20 mL/cm H20  · EMG activity during filling:  stable  · Detrusor contractions observed: No.      3.  URETHRAL FUNCTION/STORAGE PHASE:     a.  WITH prolapse reduction:  · CLPP (150 mL): Negative  at  130 cm H20  · VLPP (150 mL): Negative  at  76 cm H20   · CLPP (300 mL): Positive  at  150 cm H20  · VLPP (300 mL): Positive  at  138 cm H20   · CLPP (MAX ):    Positive  at  128 cm H20  · VLPP (MAX):     Positive  at  82 cm H20     These findings are consistent with Positive urodynamic stress incontinence.     Assessment:  UF not completed.  PF prolonged with indeterminate mechanism of void.  Compliance low normal.  Max capacity 458 mL.  DO (--).  ERIKA (+).       Cysto    Findings: Urethroscopy:  Normal with mild decrease in coaptation.  Cystoscopy:   Normal bladder mucosa, bilateral ureteral flow was noted.     Assessment: Essentially normal cystourethroscopy with mild decrease in urethral coaptation    TODAY:  1)  Mixed urinary incontinence, urge > stress/prolapse:    --no increase in UUI/ ERIKA.  Using  #4 ring with support pessary      2)Vaginal atrophy (dryness):    --using esrogen cream and trimosan     3) Loose stool:  --imporved today  --does have intermittent lower abdominal cramping       4) RUQ pain/ liver cyst  --followed by hepatology     5)  Complete uterovaginal prolapse:  --ready to schedule surgery  --continue pessary use  --would like to pursue surgical repair; not sexually active + h/o multiple bowel infections/recurrent diverticulosis; discussed increased safety profile of Lefort colpocleisis vs. Intraperitoneal procedure; she is ok with this plan  --pelvic US 12/16: normal but with EMB thickening  --pap 2012: benign; no h/o abnl paps  --EMBx 3/17: benign  --surgical plain:  D&C/hysteroscopy to address EMB thickening and LeFort colpocleisis was scheduled-- cancelled due to hernia surgery      Past Medical History:   Diagnosis Date    Arthritis     Diverticulitis     Hypertension        Past Surgical History:   Procedure Laterality Date    CHOLECYSTECTOMY      HERNIA REPAIR      kidney stones removed         Current Outpatient Medications   Medication Sig    amlodipine (NORVASC) 2.5 MG tablet Take 2.5 mg by mouth once daily.    aspirin-sod bicarb-citric acid (CHANO-SELTZER) 324 mg TbEF Take 325 mg by mouth once daily. Patient takes 1/4 tablet for upset stomach    atenolol (TENORMIN) 50 MG tablet Take by mouth. 1 Tablet Oral Every day    conjugated estrogens (PREMARIN) vaginal cream Place 0.5 g vaginally twice a week.    CREON 24,000-76,000 -120,000 unit capsule     dicyclomine (BENTYL) 10 MG capsule     FLUZONE HIGH-DOSE 2017-18, PF, 180 mcg/0.5 mL vaccine     hydrochlorothiazide (HYDRODIURIL) 25 MG tablet Take 25 mg by mouth once  "daily.    meloxicam (MOBIC) 15 MG tablet Take 15 mg by mouth once daily.     No current facility-administered medications for this visit.        ROS:  As per HPI.      Exam  /70 (BP Location: Right arm, Patient Position: Sitting, BP Method: Medium (Manual))   Ht 5' 2" (1.575 m)   Wt 63.2 kg (139 lb 5.3 oz)   BMI 25.48 kg/m²   General: alert and oriented, no acute distress  Remainder of PE deferred, as patient declines exam due to recent GI issues.       Impression  1. Uterine prolapse     2. Midline cystocele     3. Rectocele     4. Vaginal atrophy     5. Mixed stress and urge urinary incontinence       We reviewed the above issues and discussed options for short-term versus long-term management of her problems.   Plan:   1. Patient consented with Dr. Gates for hysteroscopic D&C, lefort colpocleisis, possible anterior/posterior repair with perineorrhaphy, possible laparotomy, placement of synthetic midurethral sling, and cystourethroscopy.   R/B/A reviewed. Specific risks reviewed include:  infection, bleeding, need for blood transfusion, damage to surrounding structures, anesthesia risks, death, heart attack, stroke, mesh erosion/extrusion, pain, dyspareunia, urinary retention, voiding dysfunction, urinary incontinence, exacerbation of urinary urge incontinence, and need for further surgeries.  We reviewed potential for failure of POP defect repair and need for future surgery, with no way of predicting risk.  She understands success rate of midurethral sling for ERIKA was reviewed as 80-85%, and she understands that this will not necessarily impact other types of urinary incontinence.  Alternatives reviewed include: pessary/PT for POP and pessary/periurethral injections/PT/medication for ERIKA.    2. T&S, urine HCG on DOS  3. Preoperative appointment with PCP or cardiology: Yes --pending  4. VTE Prophylaxis:  heparin 5000 u SQ TID (1st dose 2hrs preop) + SCDs  5. Patient instructed on Magnesium citrate and " chlorahexadine/dial soap prep to perform day before & AM of surgery.   6. Proceed to OR for above-mentioned procedure.      30 minutes were spent in face to face time with this patient  75 % of this time was spent in counseling and/or coordination of care    CHEVY Duval-BC [facility]  Division of Female Pelvic Medicine and Reconstructive Surgery  Department of Obstetrics & Gynecology

## 2018-08-31 ENCOUNTER — ANESTHESIA EVENT (OUTPATIENT)
Dept: SURGERY | Facility: HOSPITAL | Age: 78
End: 2018-08-31
Payer: MEDICARE

## 2018-08-31 NOTE — ANESTHESIA PREPROCEDURE EVALUATION
Lorene Stallworth, RN   Registered Nurse      Pre Admission Screening   Signed                             [x]Hide copied text    []Hover for details      Anesthesia Assessment: Preoperative EQUATION     Planned Procedure: Procedure(s) (LRB):  COLPOCLEISIS (N/A)  SLING, MIDURETHRAL (N/A)  CYSTOSCOPY (N/A)  Requested Anesthesia Type:General  Surgeon: Felicia Gates MD  Service: OB/GYN  Known or anticipated Date of Surgery:9/10/2018     Surgeon notes: reviewed     Electronic QUestionnaire Assessment completed via nurse interview with patient.         NO AQ     Triage considerations:      The patient has no apparent active cardiac condition (No unstable coronary Syndrome such as severe unstable angina or recent [<1 month] myocardial infarction, decompensated CHF, severe valvular   disease or significant arrhythmia)     Previous anesthesia records:GETA, MAC and No [id number]/2015 EGD Airway/Jaw/Neck:  Airway Findings: Mouth Opening: Normal Tongue: Normal  General Airway Assessment: Good  Mallampati: II  Improves to II with phonation.  TM Distance: Normal, at least 6 cm  Jaw/Neck Findings:  Neck ROM: Normal ROM      Dental:  Dental Findings: Upper Dentures      Last PCP note: outside [facility] -3 weeks ago  Subspecialty notes: Gastroenterology     Other important co-morbidities: HTN, GERD     Tests already available:  Available tests,  > 1 year ago . 2015 BMP.                            Instructions given. (See in Nurse's note)     Optimization:  Anesthesia Preop Clinic Assessment  Indicated-not required for this procedure    Medical Opinion Indicated-will ask OS PCP to send last visit note and testing                                        Plan:    Testing:  Hemoglobin, BMP and EKG                                         Patient  has previously scheduled Medical Appointment: none     Navigation: Tests Scheduled. -TBD                        Consults scheduled.                        Results will be tracked by Preop  "Clinic.                                                Electronically signed by Lorene Stallworth RN at 8/31/2018 10:11 AM       Pre-admit on 9/10/2018            Detailed Report    9/6/18 OS last visit note, CBC, BMP 7/23/18 obtained and scanned to media.    9/7/18 OS EKG and MRI obtained and scanned to media.                                                                                                                08/31/2018  Linda Felton is a 78 y.o., female.  Pre-operative evaluation for Procedure(s) (LRB):  COLPOCLEISIS (N/A)  SLING, MIDURETHRAL (N/A)  CYSTOSCOPY (N/A)    Linda Felton is a 78 y.o. female with PMHx of GERD, chronic diarrhea, HTN, and pelvic organ prolapse who presents for above procedure.    Estimated body mass index is 25.48 kg/m² as calculated from the following:    Height as of 8/28/18: 5' 2" (1.575 m).    Weight as of 8/28/18: 63.2 kg (139 lb 5.3 oz).    Prev airway: None documented      Patient Active Problem List   Diagnosis    Prolapsed, uterovaginal, complete    Atrophic vaginitis    Urethral hypermobility    Chronic constipation    Urinary urgency    Urinary hesitancy    Frequent UTI    Cystocele    Rectocele    Loose stools    Liver cyst    Diarrhea    Esophagitis, reflux    Helicobacter pylori gastritis       Review of patient's allergies indicates:   Allergen Reactions    Ivp dye  [iodinated contrast- oral and iv dye]      Other reaction(s): Rash    Penicillins      Other reaction(s): Rash    Sulfa (sulfonamide antibiotics)      Other reaction(s): Rash    Adhesive Rash        No current facility-administered medications on file prior to encounter.      Current Outpatient Medications on File Prior to Encounter   Medication Sig Dispense Refill    amlodipine (NORVASC) 2.5 MG tablet Take 2.5 mg by mouth once daily.  0    atenolol (TENORMIN) 50 MG tablet Take by mouth. 1 Tablet Oral Every day      hydrochlorothiazide (HYDRODIURIL) 25 MG tablet Take 25 " mg by mouth once daily.  0    meloxicam (MOBIC) 15 MG tablet Take 15 mg by mouth once daily.  0    aspirin-sod bicarb-citric acid (CHANO-SELTZER) 324 mg TbEF Take 325 mg by mouth once daily. Patient takes 1/4 tablet for upset stomach      conjugated estrogens (PREMARIN) vaginal cream Place 0.5 g vaginally twice a week. 30 g 3    CREON 24,000-76,000 -120,000 unit capsule       FLUZONE HIGH-DOSE 2017-18, PF, 180 mcg/0.5 mL vaccine          Past Surgical History:   Procedure Laterality Date    CHOLECYSTECTOMY      COLONOSCOPY N/A 10/5/2015    Procedure: COLONOSCOPY;  Surgeon: Perez White MD;  Location: [facility] (4TH FLR);  Service: Endoscopy;  Laterality: N/A;  C diff negative per Dr White/see microbiology report dated 8/26/15/svn    COLONOSCOPY N/A 10/5/2015    Performed by Perez White MD at [facility] (4TH FLR)    ESOPHAGOGASTRODUODENOSCOPY (EGD) N/A 10/5/2015    Performed by Perez White MD at [facility] (4TH FLR)    HERNIA REPAIR      kidney stones removed         Social History     Socioeconomic History    Marital status:      Spouse name: Not on file    Number of children: Not on file    Years of education: Not on file    Highest education level: Not on file   Social Needs    Financial resource strain: Not on file    Food insecurity - worry: Not on file    Food insecurity - inability: Not on file    Transportation needs - medical: Not on file    Transportation needs - non-medical: Not on file   Occupational History    Not on file   Tobacco Use    Smoking status: Never Smoker    Smokeless tobacco: Never Used   Substance and Sexual Activity    Alcohol use: No    Drug use: No    Sexual activity: No     Birth control/protection: Post-menopausal   Other Topics Concern    Not on file   Social History Narrative    Not on file         Vital Signs Range (Last 24H):         Lab Results   Component Value Date    WBC 5.25 04/10/2015    HGB 13.3 04/10/2015    HCT 39.8  04/10/2015    MCV 88 04/10/2015     04/10/2015     CMP  Sodium   Date Value Ref Range Status   07/14/2015 138 136 - 145 mmol/L Final     Potassium   Date Value Ref Range Status   07/14/2015 3.3 (L) 3.5 - 5.1 mmol/L Final     Chloride   Date Value Ref Range Status   07/14/2015 102 95 - 110 mmol/L Final     CO2   Date Value Ref Range Status   07/14/2015 29 23 - 29 mmol/L Final     Glucose   Date Value Ref Range Status   07/14/2015 103 70 - 110 mg/dL Final     BUN, Bld   Date Value Ref Range Status   07/14/2015 23 8 - 23 mg/dL Final     Creatinine   Date Value Ref Range Status   07/14/2015 1.1 0.5 - 1.4 mg/dL Final     Calcium   Date Value Ref Range Status   07/14/2015 8.8 8.7 - 10.5 mg/dL Final     Total Protein   Date Value Ref Range Status   05/05/2015 7.0 6.0 - 8.4 g/dL Final     Albumin   Date Value Ref Range Status   05/05/2015 3.5 3.5 - 5.2 g/dL Final     Total Bilirubin   Date Value Ref Range Status   05/05/2015 0.9 0.1 - 1.0 mg/dL Final     Comment:     For infants and newborns, interpretation of results should be based  on gestational age, weight and in agreement with clinical  observations.  Premature Infant recommended reference ranges:  Up to 24 hours.............<8.0 mg/dL  Up to 48 hours............<12.0 mg/dL  3-5 days..................<15.0 mg/dL  6-29 days.................<15.0 mg/dL       Alkaline Phosphatase   Date Value Ref Range Status   05/05/2015 74 55 - 135 U/L Final     AST   Date Value Ref Range Status   05/05/2015 17 10 - 40 U/L Final     ALT   Date Value Ref Range Status   05/05/2015 12 10 - 44 U/L Final     Anion Gap   Date Value Ref Range Status   07/14/2015 7 (L) 8 - 16 mmol/L Final     eGFR if    Date Value Ref Range Status   07/14/2015 56.4 (A) >60 mL/min/1.73 m^2 Final     eGFR if non    Date Value Ref Range Status   07/14/2015 48.9 (A) >60 mL/min/1.73 m^2 Final     Comment:     Calculation used to obtain the estimated glomerular filtration  rate  (eGFR) is the CKD-EPI equation. Since race is unknown   in our information system, the eGFR values for   -American and Non--American patients are given   for each creatinine result.       No results found for: INR, PROTIME    Ordered repeat labs    Diagnostic Studies:  Pelvic US    FINDINGS:  Uterus:    Size: 7.2 x 2.7 x 3.5 cm    Masses: 1.7 x 1.5 x 1.5 cm intramural fibroid.  0.8 cm intrauterine calcification with significant posterior shadowing, likely a calcified intramural fibroid.    Endometrium: Upper limit of normal in this post menopausal patient, measuring 5 mm.    Right ovary:    Size: 1.3 x 1.1 x 1.1 cm    Appearance: Normal    Vascular flow: Normal.    Left ovary:    Not definitively visualized.    Free Fluid:    None.      Impression       Two intramural fibroids as above.    Left ovary not seen.  This may be secondary to bowel gas artifact and/or its small size.    Electronically signed by resident: Primo Rosas  Date: 08/14/2018  Time: 15:14     EKG:  Can be found in media, rate in the 50s, NSR  [id number]    2D Echo:  None      Anesthesia Evaluation    I have reviewed the Patient Summary Reports.    I have reviewed the Nursing Notes.      Review of Systems  Anesthesia Hx:  No problems with previous Anesthesia  History of prior surgery of interest to airway management or planning: Previous anesthesia: MAC  10/2015 EGD with MAC.  Procedure performed at an [facility]. Denies Family Hx of Anesthesia complications.  Personal Hx of Anesthesia complications, Post-Operative Nausea/Vomiting, in the past, but not with recent anesthetics / prophylaxis   Social:  Non-Smoker    Hematology/Oncology:  Hematology Normal   Oncology Normal     EENT/Dental:EENT/Dental Normal   Cardiovascular:   Hypertension  Denies Angina.          Functional Capacity 4 METS  Hypertension , Well Controlled on Rx    Pulmonary:  Pulmonary Normal  Denies Shortness of breath.  Denies Recent URI.    Renal/:  Renal  Symptoms/Infections/Stones: urgency, incontinence.    Hepatic/GI:   GERD, well controlled Liver Disease, (cyst)  Hepatic/GI Symptoms: diarrhea.  Esophageal / Stomach Disorders Gerd  Bowel Conditions:  Diverticulitis  Liver Disease Liver cyst   Musculoskeletal:   Arthritis     Neurological:  Neurology Normal    Endocrine:  Endocrine Normal    Psych:  Psychiatric Normal           Physical Exam  General:  Well nourished    Airway/Jaw/Neck:  Airway Findings: Mouth Opening: Normal Tongue: Normal  General Airway Assessment: Adult  Mallampati: III  TM Distance: Normal, at least 6 cm  Jaw/Neck Findings:  Neck ROM: Normal ROM     Eyes/Ears/Nose:  EYES/EARS/NOSE FINDINGS: Normal   Dental:  Dental Findings: Upper Dentures    Chest/Lungs:  Chest/Lungs Findings: Clear to auscultation     Heart/Vascular:  Heart Findings: Rate: Normal  Rhythm: Regular Rhythm  Sounds: Normal     Abdomen:  Abdomen Findings:  Soft, Nontender      Skin:  Skin Findings: Normal    Mental Status:  Mental Status Findings:  Cooperative, Alert and Oriented         Anesthesia Plan  Type of Anesthesia, risks & benefits discussed:  Anesthesia Type:  general  Patient's Preference:   Intra-op Monitoring Plan: standard ASA monitors  Intra-op Monitoring Plan Comments:   Post Op Pain Control Plan: multimodal analgesia, IV/PO Opioids PRN and per primary service following discharge from PACU  Post Op Pain Control Plan Comments:   Induction:   IV  Beta Blocker:  Patient is on a Beta-Blocker and has received one dose within the past 24 hours (No further documentation required).       Informed Consent: Patient understands risks and agrees with Anesthesia plan.  Questions answered. Anesthesia consent signed with patient.  ASA Score: 2     Day of Surgery Review of History & Physical:            Ready For Surgery From Anesthesia Perspective.

## 2018-08-31 NOTE — PRE ADMISSION SCREENING
Anesthesia Assessment: Preoperative EQUATION    Planned Procedure: Procedure(s) (LRB):  COLPOCLEISIS (N/A)  SLING, MIDURETHRAL (N/A)  CYSTOSCOPY (N/A)  Requested Anesthesia Type:General  Surgeon: Felicia Gates MD  Service: OB/GYN  Known or anticipated Date of Surgery:9/10/2018    Surgeon notes: reviewed    Electronic QUestionnaire Assessment completed via nurse interview with patient.        NO AQ    Triage considerations:     The patient has no apparent active cardiac condition (No unstable coronary Syndrome such as severe unstable angina or recent [<1 month] myocardial infarction, decompensated CHF, severe valvular   disease or significant arrhythmia)    Previous anesthesia records:GETA, MAC and No [id number] EGD Airway/Jaw/Neck:  Airway Findings: Mouth Opening: Normal Tongue: Normal  General Airway Assessment: Good  Mallampati: II  Improves to II with phonation.  TM Distance: Normal, at least 6 cm  Jaw/Neck Findings:  Neck ROM: Normal ROM      Dental:  Dental Findings: Upper Dentures     Last PCP note: outside [facility] -3 weeks ago  Subspecialty notes: Gastroenterology    Other important co-morbidities: HTN, GERD     Tests already available:  Available tests,  > 1 year ago . 2015 BMP.            Instructions given. (See in Nurse's note)    Optimization:  Anesthesia Preop Clinic Assessment  Indicated-not required for this procedure    Medical Opinion Indicated-will ask OS PCP to send last visit note and testing           Plan:    Testing:  Hemoglobin, BMP and EKG        Patient  has previously scheduled Medical Appointment: none    Navigation: Tests Scheduled. -TBD             Consults scheduled.             Results will be tracked by Preop Clinic.

## 2018-09-06 ENCOUNTER — TELEPHONE (OUTPATIENT)
Dept: UROGYNECOLOGY | Facility: CLINIC | Age: 78
End: 2018-09-06

## 2018-09-06 DIAGNOSIS — Z01.818 PREOPERATIVE TESTING: Primary | ICD-10-CM

## 2018-09-06 NOTE — TELEPHONE ENCOUNTER
Spoke with pt who had a few questioning pertaining to surgery pt will take 2 doses of miralax one in the morning and one in the afternoon per NP Keyla, pt also received a letter from her insurance stating that her procedure had been authorized, explain to pt I will document this information she voiced understanding and call was ended.

## 2018-09-06 NOTE — TELEPHONE ENCOUNTER
----- Message from Umesh Ag sent at 9/6/2018 12:50 PM CDT -----  Contact: YUE CLOUD [[id number]]            Name of Who is Calling: YUE CLOUD [[id number]]      What is the request in detail: Patient requesting to speak with NP in regards to questions about surgery Monday.      Can the clinic reply by [facility]: no      [facility] [phone]

## 2018-09-07 ENCOUNTER — PATIENT MESSAGE (OUTPATIENT)
Dept: UROGYNECOLOGY | Facility: CLINIC | Age: 78
End: 2018-09-07

## 2018-09-08 ENCOUNTER — PATIENT MESSAGE (OUTPATIENT)
Dept: UROGYNECOLOGY | Facility: CLINIC | Age: 78
End: 2018-09-08

## 2018-09-10 ENCOUNTER — ANESTHESIA (OUTPATIENT)
Dept: SURGERY | Facility: HOSPITAL | Age: 78
End: 2018-09-10
Payer: MEDICARE

## 2018-09-10 ENCOUNTER — HOSPITAL ENCOUNTER (OUTPATIENT)
Facility: HOSPITAL | Age: 78
Discharge: HOME | End: 2018-09-11
Attending: OBSTETRICS & GYNECOLOGY | Admitting: OBSTETRICS & GYNECOLOGY
Payer: MEDICARE

## 2018-09-10 DIAGNOSIS — N81.2 UTEROVAGINAL PROLAPSE, INCOMPLETE: ICD-10-CM

## 2018-09-10 DIAGNOSIS — N81.3 PROLAPSED, UTEROVAGINAL, COMPLETE: Primary | ICD-10-CM

## 2018-09-10 LAB
ABO + RH BLD: NORMAL
BLD GP AB SCN CELLS X3 SERPL QL: NORMAL
POCT GLUCOSE: 112 MG/DL (ref 70–110)

## 2018-09-10 PROCEDURE — 36000707: Performed by: OBSTETRICS & GYNECOLOGY

## 2018-09-10 PROCEDURE — 25000003 PHARM REV CODE 250: Performed by: ANESTHESIOLOGY

## 2018-09-10 PROCEDURE — 63600175 PHARM REV CODE 636 W HCPCS: Performed by: STUDENT IN AN ORGANIZED HEALTH CARE EDUCATION/TRAINING PROGRAM

## 2018-09-10 PROCEDURE — 37000009 HC ANESTHESIA EA ADD 15 MINS: Performed by: OBSTETRICS & GYNECOLOGY

## 2018-09-10 PROCEDURE — 71000039 HC RECOVERY, EACH ADD'L HOUR: Performed by: OBSTETRICS & GYNECOLOGY

## 2018-09-10 PROCEDURE — 36000706: Performed by: OBSTETRICS & GYNECOLOGY

## 2018-09-10 PROCEDURE — 82962 GLUCOSE BLOOD TEST: CPT | Performed by: OBSTETRICS & GYNECOLOGY

## 2018-09-10 PROCEDURE — 63600175 PHARM REV CODE 636 W HCPCS: Performed by: OBSTETRICS & GYNECOLOGY

## 2018-09-10 PROCEDURE — 94761 N-INVAS EAR/PLS OXIMETRY MLT: CPT

## 2018-09-10 PROCEDURE — 27000221 HC OXYGEN, UP TO 24 HOURS

## 2018-09-10 PROCEDURE — 88305 TISSUE EXAM BY PATHOLOGIST: CPT | Performed by: PATHOLOGY

## 2018-09-10 PROCEDURE — 25000003 PHARM REV CODE 250

## 2018-09-10 PROCEDURE — 27201423 OPTIME MED/SURG SUP & DEVICES STERILE SUPPLY: Performed by: OBSTETRICS & GYNECOLOGY

## 2018-09-10 PROCEDURE — 25000003 PHARM REV CODE 250: Performed by: STUDENT IN AN ORGANIZED HEALTH CARE EDUCATION/TRAINING PROGRAM

## 2018-09-10 PROCEDURE — 88342 IMHCHEM/IMCYTCHM 1ST ANTB: CPT | Mod: 26,,, | Performed by: PATHOLOGY

## 2018-09-10 PROCEDURE — 57288 REPAIR BLADDER DEFECT: CPT | Mod: ,,, | Performed by: OBSTETRICS & GYNECOLOGY

## 2018-09-10 PROCEDURE — 71000033 HC RECOVERY, INTIAL HOUR: Performed by: OBSTETRICS & GYNECOLOGY

## 2018-09-10 PROCEDURE — 25000003 PHARM REV CODE 250: Performed by: OBSTETRICS & GYNECOLOGY

## 2018-09-10 PROCEDURE — 11000001 HC ACUTE MED/SURG PRIVATE ROOM

## 2018-09-10 PROCEDURE — 88342 IMHCHEM/IMCYTCHM 1ST ANTB: CPT | Performed by: PATHOLOGY

## 2018-09-10 PROCEDURE — C1771 REP DEV, URINARY, W/SLING: HCPCS | Performed by: OBSTETRICS & GYNECOLOGY

## 2018-09-10 PROCEDURE — D9220A PRA ANESTHESIA: Mod: ,,, | Performed by: ANESTHESIOLOGY

## 2018-09-10 PROCEDURE — 58558 HYSTEROSCOPY BIOPSY: CPT | Mod: 51,,, | Performed by: OBSTETRICS & GYNECOLOGY

## 2018-09-10 PROCEDURE — 37000008 HC ANESTHESIA 1ST 15 MINUTES: Performed by: OBSTETRICS & GYNECOLOGY

## 2018-09-10 PROCEDURE — G0378 HOSPITAL OBSERVATION PER HR: HCPCS

## 2018-09-10 PROCEDURE — 86901 BLOOD TYPING SEROLOGIC RH(D): CPT

## 2018-09-10 PROCEDURE — 57120 COLPOCLEISIS LE FORT TYPE: CPT | Mod: 51,,, | Performed by: OBSTETRICS & GYNECOLOGY

## 2018-09-10 PROCEDURE — 88305 TISSUE EXAM BY PATHOLOGIST: CPT | Mod: 26,,, | Performed by: PATHOLOGY

## 2018-09-10 DEVICE — SLING HALO SHAPE OBTRYX II: Type: IMPLANTABLE DEVICE | Site: VAGINA | Status: FUNCTIONAL

## 2018-09-10 RX ORDER — DEXAMETHASONE SODIUM PHOSPHATE 4 MG/ML
INJECTION, SOLUTION INTRA-ARTICULAR; INTRALESIONAL; INTRAMUSCULAR; INTRAVENOUS; SOFT TISSUE
Status: DISCONTINUED | OUTPATIENT
Start: 2018-09-10 | End: 2018-09-10

## 2018-09-10 RX ORDER — ATENOLOL 50 MG/1
50 TABLET ORAL DAILY
Status: DISCONTINUED | OUTPATIENT
Start: 2018-09-11 | End: 2018-09-11 | Stop reason: HOSPADM

## 2018-09-10 RX ORDER — HYDROMORPHONE HYDROCHLORIDE 1 MG/ML
0.5 INJECTION, SOLUTION INTRAMUSCULAR; INTRAVENOUS; SUBCUTANEOUS
Status: DISCONTINUED | OUTPATIENT
Start: 2018-09-10 | End: 2018-09-11 | Stop reason: HOSPADM

## 2018-09-10 RX ORDER — NEOSTIGMINE METHYLSULFATE 1 MG/ML
INJECTION, SOLUTION INTRAVENOUS
Status: DISCONTINUED | OUTPATIENT
Start: 2018-09-10 | End: 2018-09-10

## 2018-09-10 RX ORDER — ONDANSETRON 2 MG/ML
INJECTION INTRAMUSCULAR; INTRAVENOUS
Status: DISCONTINUED | OUTPATIENT
Start: 2018-09-10 | End: 2018-09-10

## 2018-09-10 RX ORDER — GLYCOPYRROLATE 0.2 MG/ML
INJECTION INTRAMUSCULAR; INTRAVENOUS
Status: DISCONTINUED | OUTPATIENT
Start: 2018-09-10 | End: 2018-09-10

## 2018-09-10 RX ORDER — CEFAZOLIN SODIUM 1 G/3ML
2 INJECTION, POWDER, FOR SOLUTION INTRAMUSCULAR; INTRAVENOUS
Status: DISCONTINUED | OUTPATIENT
Start: 2018-09-10 | End: 2018-09-10

## 2018-09-10 RX ORDER — CIPROFLOXACIN 2 MG/ML
400 INJECTION, SOLUTION INTRAVENOUS
Status: DISCONTINUED | OUTPATIENT
Start: 2018-09-10 | End: 2018-09-10 | Stop reason: CLARIF

## 2018-09-10 RX ORDER — HYDROCODONE BITARTRATE AND ACETAMINOPHEN 7.5; 325 MG/1; MG/1
1 TABLET ORAL EVERY 4 HOURS PRN
Status: DISCONTINUED | OUTPATIENT
Start: 2018-09-10 | End: 2018-09-11 | Stop reason: HOSPADM

## 2018-09-10 RX ORDER — KETAMINE HCL IN 0.9 % NACL 50 MG/5 ML
SYRINGE (ML) INTRAVENOUS
Status: DISCONTINUED | OUTPATIENT
Start: 2018-09-10 | End: 2018-09-10

## 2018-09-10 RX ORDER — FENTANYL CITRATE 50 UG/ML
25 INJECTION, SOLUTION INTRAMUSCULAR; INTRAVENOUS EVERY 5 MIN PRN
Status: DISCONTINUED | OUTPATIENT
Start: 2018-09-10 | End: 2018-09-10 | Stop reason: HOSPADM

## 2018-09-10 RX ORDER — SODIUM CHLORIDE 0.9 % (FLUSH) 0.9 %
3 SYRINGE (ML) INJECTION
Status: DISCONTINUED | OUTPATIENT
Start: 2018-09-10 | End: 2018-09-10 | Stop reason: HOSPADM

## 2018-09-10 RX ORDER — ONDANSETRON 2 MG/ML
4 INJECTION INTRAMUSCULAR; INTRAVENOUS DAILY PRN
Status: DISCONTINUED | OUTPATIENT
Start: 2018-09-10 | End: 2018-09-10 | Stop reason: HOSPADM

## 2018-09-10 RX ORDER — HEPARIN SODIUM 5000 [USP'U]/ML
5000 INJECTION, SOLUTION INTRAVENOUS; SUBCUTANEOUS
Status: DISCONTINUED | OUTPATIENT
Start: 2018-09-10 | End: 2018-09-10

## 2018-09-10 RX ORDER — DEXTROSE MONOHYDRATE, SODIUM CHLORIDE, AND POTASSIUM CHLORIDE 50; 1.49; 4.5 G/1000ML; G/1000ML; G/1000ML
INJECTION, SOLUTION INTRAVENOUS
Status: COMPLETED
Start: 2018-09-10 | End: 2018-09-10

## 2018-09-10 RX ORDER — ROCURONIUM BROMIDE 10 MG/ML
INJECTION, SOLUTION INTRAVENOUS
Status: DISCONTINUED | OUTPATIENT
Start: 2018-09-10 | End: 2018-09-10

## 2018-09-10 RX ORDER — LIDOCAINE HYDROCHLORIDE 10 MG/ML
1 INJECTION, SOLUTION EPIDURAL; INFILTRATION; INTRACAUDAL; PERINEURAL ONCE
Status: COMPLETED | OUTPATIENT
Start: 2018-09-10 | End: 2018-09-10

## 2018-09-10 RX ORDER — IBUPROFEN 600 MG/1
600 TABLET ORAL EVERY 6 HOURS
Status: DISCONTINUED | OUTPATIENT
Start: 2018-09-11 | End: 2018-09-11

## 2018-09-10 RX ORDER — FENTANYL CITRATE 50 UG/ML
INJECTION, SOLUTION INTRAMUSCULAR; INTRAVENOUS
Status: DISCONTINUED | OUTPATIENT
Start: 2018-09-10 | End: 2018-09-10

## 2018-09-10 RX ORDER — PROPOFOL 10 MG/ML
VIAL (ML) INTRAVENOUS
Status: DISCONTINUED | OUTPATIENT
Start: 2018-09-10 | End: 2018-09-10

## 2018-09-10 RX ORDER — ACETAMINOPHEN 10 MG/ML
INJECTION, SOLUTION INTRAVENOUS
Status: DISCONTINUED | OUTPATIENT
Start: 2018-09-10 | End: 2018-09-10

## 2018-09-10 RX ORDER — DIPHENHYDRAMINE HCL 25 MG
25 CAPSULE ORAL EVERY 4 HOURS PRN
Status: DISCONTINUED | OUTPATIENT
Start: 2018-09-10 | End: 2018-09-11 | Stop reason: HOSPADM

## 2018-09-10 RX ORDER — DIPHENHYDRAMINE HYDROCHLORIDE 50 MG/ML
25 INJECTION INTRAMUSCULAR; INTRAVENOUS
Status: DISCONTINUED | OUTPATIENT
Start: 2018-09-10 | End: 2018-09-10

## 2018-09-10 RX ORDER — LIDOCAINE HYDROCHLORIDE AND EPINEPHRINE 10; 10 MG/ML; UG/ML
INJECTION, SOLUTION INFILTRATION; PERINEURAL
Status: DISCONTINUED | OUTPATIENT
Start: 2018-09-10 | End: 2018-09-10

## 2018-09-10 RX ORDER — DOCUSATE SODIUM 100 MG/1
100 CAPSULE, LIQUID FILLED ORAL 2 TIMES DAILY
Status: DISCONTINUED | OUTPATIENT
Start: 2018-09-10 | End: 2018-09-11 | Stop reason: HOSPADM

## 2018-09-10 RX ORDER — SODIUM CHLORIDE 9 MG/ML
INJECTION, SOLUTION INTRAVENOUS CONTINUOUS
Status: DISCONTINUED | OUTPATIENT
Start: 2018-09-10 | End: 2018-09-10

## 2018-09-10 RX ORDER — HYDROCODONE BITARTRATE AND ACETAMINOPHEN 5; 325 MG/1; MG/1
1 TABLET ORAL EVERY 4 HOURS PRN
Status: DISCONTINUED | OUTPATIENT
Start: 2018-09-10 | End: 2018-09-11 | Stop reason: HOSPADM

## 2018-09-10 RX ORDER — DIPHENHYDRAMINE HYDROCHLORIDE 50 MG/ML
25 INJECTION INTRAMUSCULAR; INTRAVENOUS EVERY 4 HOURS PRN
Status: DISCONTINUED | OUTPATIENT
Start: 2018-09-10 | End: 2018-09-11 | Stop reason: HOSPADM

## 2018-09-10 RX ORDER — PHENYLEPHRINE HYDROCHLORIDE 10 MG/ML
INJECTION INTRAVENOUS
Status: DISCONTINUED | OUTPATIENT
Start: 2018-09-10 | End: 2018-09-10

## 2018-09-10 RX ORDER — ONDANSETRON 8 MG/1
8 TABLET, ORALLY DISINTEGRATING ORAL EVERY 8 HOURS PRN
Status: DISCONTINUED | OUTPATIENT
Start: 2018-09-10 | End: 2018-09-11 | Stop reason: HOSPADM

## 2018-09-10 RX ORDER — LIDOCAINE HCL/PF 100 MG/5ML
SYRINGE (ML) INTRAVENOUS
Status: DISCONTINUED | OUTPATIENT
Start: 2018-09-10 | End: 2018-09-10

## 2018-09-10 RX ORDER — HEPARIN SODIUM 5000 [USP'U]/ML
5000 INJECTION, SOLUTION INTRAVENOUS; SUBCUTANEOUS EVERY 8 HOURS
Status: DISCONTINUED | OUTPATIENT
Start: 2018-09-10 | End: 2018-09-11 | Stop reason: HOSPADM

## 2018-09-10 RX ORDER — BISACODYL 10 MG
10 SUPPOSITORY, RECTAL RECTAL DAILY PRN
Status: DISCONTINUED | OUTPATIENT
Start: 2018-09-10 | End: 2018-09-11 | Stop reason: HOSPADM

## 2018-09-10 RX ORDER — CLINDAMYCIN PHOSPHATE 900 MG/50ML
900 INJECTION, SOLUTION INTRAVENOUS
Status: DISCONTINUED | OUTPATIENT
Start: 2018-09-10 | End: 2018-09-10 | Stop reason: CLARIF

## 2018-09-10 RX ORDER — SCOLOPAMINE TRANSDERMAL SYSTEM 1 MG/1
1 PATCH, EXTENDED RELEASE TRANSDERMAL ONCE
Status: DISCONTINUED | OUTPATIENT
Start: 2018-09-10 | End: 2018-09-11 | Stop reason: HOSPADM

## 2018-09-10 RX ORDER — DEXTROSE MONOHYDRATE, SODIUM CHLORIDE, AND POTASSIUM CHLORIDE 50; 1.49; 4.5 G/1000ML; G/1000ML; G/1000ML
INJECTION, SOLUTION INTRAVENOUS CONTINUOUS
Status: DISCONTINUED | OUTPATIENT
Start: 2018-09-10 | End: 2018-09-11 | Stop reason: HOSPADM

## 2018-09-10 RX ADMIN — PHENYLEPHRINE HYDROCHLORIDE 100 MCG: 10 INJECTION INTRAVENOUS at 08:09

## 2018-09-10 RX ADMIN — PHENYLEPHRINE HYDROCHLORIDE 100 MCG: 10 INJECTION INTRAVENOUS at 07:09

## 2018-09-10 RX ADMIN — CIPROFLOXACIN 400 MG: 2 INJECTION, SOLUTION INTRAVENOUS at 06:09

## 2018-09-10 RX ADMIN — Medication 10 MG: at 09:09

## 2018-09-10 RX ADMIN — ROCURONIUM BROMIDE 20 MG: 10 INJECTION, SOLUTION INTRAVENOUS at 08:09

## 2018-09-10 RX ADMIN — PHENYLEPHRINE HYDROCHLORIDE 100 MCG: 10 INJECTION INTRAVENOUS at 09:09

## 2018-09-10 RX ADMIN — HEPARIN SODIUM 5000 UNITS: 5000 INJECTION, SOLUTION INTRAVENOUS; SUBCUTANEOUS at 09:09

## 2018-09-10 RX ADMIN — ROCURONIUM BROMIDE 10 MG: 10 INJECTION, SOLUTION INTRAVENOUS at 10:09

## 2018-09-10 RX ADMIN — ROCURONIUM BROMIDE 30 MG: 10 INJECTION, SOLUTION INTRAVENOUS at 07:09

## 2018-09-10 RX ADMIN — NEOSTIGMINE METHYLSULFATE 5 MG: 1 INJECTION INTRAVENOUS at 10:09

## 2018-09-10 RX ADMIN — DEXTROSE MONOHYDRATE, SODIUM CHLORIDE, AND POTASSIUM CHLORIDE: 50; 4.5; 1.49 INJECTION, SOLUTION INTRAVENOUS at 08:09

## 2018-09-10 RX ADMIN — PHENYLEPHRINE HYDROCHLORIDE 100 MCG: 10 INJECTION INTRAVENOUS at 10:09

## 2018-09-10 RX ADMIN — SODIUM CHLORIDE: 0.9 INJECTION, SOLUTION INTRAVENOUS at 06:09

## 2018-09-10 RX ADMIN — DEXAMETHASONE SODIUM PHOSPHATE 4 MG: 4 INJECTION, SOLUTION INTRAMUSCULAR; INTRAVENOUS at 07:09

## 2018-09-10 RX ADMIN — HEPARIN SODIUM 5000 UNITS: 5000 INJECTION, SOLUTION INTRAVENOUS; SUBCUTANEOUS at 06:09

## 2018-09-10 RX ADMIN — ONDANSETRON 4 MG: 2 INJECTION INTRAMUSCULAR; INTRAVENOUS at 11:09

## 2018-09-10 RX ADMIN — SCOPALAMINE 1 PATCH: 1 PATCH, EXTENDED RELEASE TRANSDERMAL at 06:09

## 2018-09-10 RX ADMIN — ONDANSETRON 4 MG: 2 INJECTION INTRAMUSCULAR; INTRAVENOUS at 10:09

## 2018-09-10 RX ADMIN — CEFAZOLIN 2 G: 330 INJECTION, POWDER, FOR SOLUTION INTRAMUSCULAR; INTRAVENOUS at 07:09

## 2018-09-10 RX ADMIN — SODIUM CHLORIDE, SODIUM GLUCONATE, SODIUM ACETATE, POTASSIUM CHLORIDE, MAGNESIUM CHLORIDE, SODIUM PHOSPHATE, DIBASIC, AND POTASSIUM PHOSPHATE: .53; .5; .37; .037; .03; .012; .00082 INJECTION, SOLUTION INTRAVENOUS at 09:09

## 2018-09-10 RX ADMIN — LIDOCAINE HYDROCHLORIDE 1 MG: 10 INJECTION, SOLUTION EPIDURAL; INFILTRATION; INTRACAUDAL at 06:09

## 2018-09-10 RX ADMIN — DEXTROSE MONOHYDRATE, SODIUM CHLORIDE, AND POTASSIUM CHLORIDE: 50; 4.5; 1.49 INJECTION, SOLUTION INTRAVENOUS at 11:09

## 2018-09-10 RX ADMIN — ROCURONIUM BROMIDE 10 MG: 10 INJECTION, SOLUTION INTRAVENOUS at 09:09

## 2018-09-10 RX ADMIN — LIDOCAINE HYDROCHLORIDE 60 MG: 20 INJECTION, SOLUTION INTRAVENOUS at 07:09

## 2018-09-10 RX ADMIN — SODIUM CHLORIDE, SODIUM GLUCONATE, SODIUM ACETATE, POTASSIUM CHLORIDE, MAGNESIUM CHLORIDE, SODIUM PHOSPHATE, DIBASIC, AND POTASSIUM PHOSPHATE: .53; .5; .37; .037; .03; .012; .00082 INJECTION, SOLUTION INTRAVENOUS at 07:09

## 2018-09-10 RX ADMIN — ACETAMINOPHEN 1000 MG: 10 INJECTION, SOLUTION INTRAVENOUS at 07:09

## 2018-09-10 RX ADMIN — GLYCOPYRROLATE 0.6 MG: 0.2 INJECTION, SOLUTION INTRAMUSCULAR; INTRAVENOUS at 10:09

## 2018-09-10 RX ADMIN — Medication 20 MG: at 07:09

## 2018-09-10 RX ADMIN — PROPOFOL 100 MG: 10 INJECTION, EMULSION INTRAVENOUS at 07:09

## 2018-09-10 RX ADMIN — HEPARIN SODIUM 5000 UNITS: 5000 INJECTION, SOLUTION INTRAVENOUS; SUBCUTANEOUS at 03:09

## 2018-09-10 RX ADMIN — FENTANYL CITRATE 100 MCG: 50 INJECTION, SOLUTION INTRAMUSCULAR; INTRAVENOUS at 07:09

## 2018-09-10 NOTE — TRANSFER OF CARE
"Anesthesia Transfer of Care Note    Patient: iLnda Felton    Procedure(s) Performed: Procedure(s) (LRB):  COLPOCLEISIS (N/A)  SLING, MIDURETHRAL (N/A)  CYSTOSCOPY (N/A)  HYSTEROSCOPY, WITH DILATION AND CURETTAGE OF UTERUS    Patient location: PACU    Anesthesia Type: general    Transport from OR: Transported from OR on 6-10 L/min O2 by face mask with adequate spontaneous ventilation    Post pain: adequate analgesia    Post assessment: no apparent anesthetic complications    Post vital signs: stable    Level of consciousness: awake    Nausea/Vomiting: no nausea/vomiting    Complications: none    Transfer of care protocol was followed      Last vitals:   Visit Vitals  /60 (BP Location: Right arm, Patient Position: Lying)   Pulse 70   Temp 36.7 °C (98.1 °F) (Temporal)   Resp 18   Ht 5' 2" (1.575 m)   Wt 61.7 kg (136 lb)   SpO2 100%   Breastfeeding? No   BMI 24.87 kg/m²     "

## 2018-09-10 NOTE — INTERVAL H&P NOTE
The patient has been examined and the H&P has been reviewed:    I concur with the findings and no changes have occurred since H&P was written.    Surgery risks, benefits and alternative options discussed and understood by patient/family.          Active Hospital Problems    Diagnosis  POA    Uterovaginal prolapse, incomplete [N81.2]  Yes      Resolved Hospital Problems   No resolved problems to display.

## 2018-09-10 NOTE — OP NOTE
Title of Operation:  1)  Hysteroscopy with dilation and curettage/polypectomy  2)  LeFort colpocleisis with colpectomy  3)  Placement of transobturator tension-free midurethral sling, Obtryx II ([employer])  4)  Perineorrhaphy with high levator plication    Indications for Surgery:    1)  UI:  (--) ERIKA   (+) UUI    (+) pads:3/day, usually moderate wetness.  Daytime frequency: Q 3 hours.  Nocturia: NO.   (--) dysuria,  (--) hematuria,  (--) frequent UTIs.  (+) complete bladder emptying.      2)  POP:  Present. below introitus.  Symptoms:(--)    (--) vaginal bleeding. (+) vaginal discharge- scant pink discharge (--) sexually active.  (--)  Vaginal dryness.  (+) vaginal estrogen use.      3)  BM:  (--) constipation/straining.  (--) chronic diarrhea. (--) hematochezia.  (--) fecal incontinence.  (--) fecal smearing/urgency.  (--) incomplete evacuation.  Not taking fiber regularly.    --has diverticulosis (with h/o diverticulitis)--trying to watch irritants (cheese, peanut butter)  --symptoms have been better-- denies diarrhea   --Patient had C. Diff and  + H. Pylori infections   --had incarcerated hernia in      4)pessary:  Denies pain or bleeding. Using #4 ring with support.     5)Patient had C. Diff and  + H. Pylori infections - followed by GI  Still has + abdominal pain and loose frequent stools.  Intermittent diarrhea alternating with constipation.           12/2016  The uterus measures 7.1 x 3.3 x 3.8 cm. Uterine parenchyma is homogeneous with evidence of a few uterine fibroids.  One appears partially calcified and measures 0.7 x 0.6 x 0.3 cm.  The largest is in the myometrium of the uterine fundus measuring 1.6 x 1.6 x 1.6 cm.  Additionally there is a new fibroid anteriorly measuring 0.9 x 0.7 x 0.9 cm. The endometrial stripe is thickened and measures 0.7 cm.    The right ovary is normal in size and measures 1.1 1.0 x 1.2 cm. The left ovary is normal in size and measures 1.1 x 0.4 x 1.2 cm. Follicles are  seen in both ovaries. Arterial and venous flow are preserved bilaterally with resistive indices of 0.7 on the right and 0.75 on the left. No free fluid is seen.   Impression      The uterus demonstrates presence of a few fibroids, 2 of which are stable in appearance, and 1 new fibroid.  There is endometrial thickening, underlying pathology cannot be excluded.  Consider further evaluation.  This report has been flagged in the Epic medical record.      03/14/2017  embx  ATROPHIC ENDOMETRIUM.  NO HYPERPLASIA OR MALIGNANCY IDENTIFIED     09/07/2017  Suds  1.  VOIDING PHASE:       a.  Uroflowmetry:  Not performed.       b.  Pressure flow:  · Prolapse reduction: Yes (pessary)0  · Voided volume:   458 mL  · Voiding time:   507 seconds  · Peak flow:  9 mL/s   · Avg flow:  2 mL/s  · Max det pressure:  36  cm H20  · Det pressure at max flow: 29 cm H20  · Void initiated by unable to discern as pves was dislodged during study.    · Urethral relaxation (EMG):  absent.    · PVR (calculated):  0 mL     The overall configuration of this pressure flow study was prolonged with indeterminate mechanism of void.       2.  FILLING PHASE:  · 1st desire: 98 mL  · Normal desire:  214 mL  · Strong desire:  329 mL  · Urgency:  397 mL  · Compliance (calculated)  ~20 mL/cm H20  · EMG activity during filling:  stable  · Detrusor contractions observed: No.      3.  URETHRAL FUNCTION/STORAGE PHASE:     a.  WITH prolapse reduction:  · CLPP (150 mL): Negative  at  130 cm H20  · VLPP (150 mL): Negative  at  76 cm H20   · CLPP (300 mL): Positive  at  150 cm H20  · VLPP (300 mL): Positive  at  138 cm H20   · CLPP (MAX ):    Positive  at  128 cm H20  · VLPP (MAX):     Positive  at  82 cm H20     These findings are consistent with Positive urodynamic stress incontinence.     Assessment:  UF not completed.  PF prolonged with indeterminate mechanism of void.  Compliance low normal.  Max capacity 458 mL.  DO (--).  ERIKA (+).        Cysto    Findings: Urethroscopy:  Normal with mild decrease in coaptation.  Cystoscopy:  Normal bladder mucosa, bilateral ureteral flow was noted.     Assessment: Essentially normal cystourethroscopy with mild decrease in urethral coaptation     TODAY:  1)  Mixed urinary incontinence, urge > stress/prolapse:    --no increase in UUI/ ERIKA.  Using  #4 ring with support pessary      2)Vaginal atrophy (dryness):    --using esrogen cream and trimosan     3) Loose stool:  --imporved today  --does have intermittent lower abdominal cramping       4) RUQ pain/ liver cyst  --followed by hepatology     5)  Complete uterovaginal prolapse:  --ready to schedule surgery  --continue pessary use  --would like to pursue surgical repair; not sexually active + h/o multiple bowel infections/recurrent diverticulosis; discussed increased safety profile of Lefort colpocleisis vs. Intraperitoneal procedure; she is ok with this plan  --pelvic US 12/16: normal but with EMB thickening  --pap 2012: benign; no h/o abnl paps  --EMBx 3/17: benign  --surgical plain:  D&C/hysteroscopy to address EMB thickening and LeFort colpocleisis was scheduled-- cancelled due to hernia surgery    Preoperative Diagnosis:  1. Uterine prolapse      2. Midline cystocele      3. Rectocele      4. Vaginal atrophy      5. Mixed stress and urge urinary incontinence     6.    Urodynamic stress incontinence    Postoperative Diagnosis:  1. Uterine prolapse      2. Midline cystocele      3. Rectocele      4. Vaginal atrophy      5. Mixed stress and urge urinary incontinence     6.    Urodynamic stress incontinence  7.    Vaginal granulation tissue (chronic pessary use)    Anesthesia:  General endotracheal anesthesia.  Additionally, a dilute solution of 1% lidocaine with epinephrine was injected vaginally for local anesthesia.    Specimen (Bacteriological, Pathological or other):  Endometrial curettings  Excised fragments of vaginal epithelium    Prosthetic  Device/Implant:  1)  Obtryx II sling ([employer]).  LOT# [id number].      Surgeons Narrative:    Surgeon: Felicia Gates MD    Assistants:  Elgin Adkins MD ([id number])     Intravenous Fluids:  1600 mL    Hysteroscopic fluid: 600 mL in/575 mL collected.  Deficit 25 mL.       Estimated Blood Loss:  150 mL     Urine Output:  800 mL     Counts:  Sponge, lap, needle counts correct x 2.     Drains: Frazier catheter.     Disposition:  The patient was sent to the PACU in stable condition.     Findings:     1.  On exam under anesthesia,  normal external female genitalia. There was prolapse as previously noted in clinic.  Uterus and cervix: Normal size  Adnexa: normal bimanual exam.  Of note, there was significant granulation tissue along most of the posterior vagina, and removal of the epithelium was unnecessary posteriorly.      2.  On hysteroscopy:  The uterine cavity was normal.  Bilateral ostia were visible, R patent but L appeared not so.  Flat, broad-based polyp at the anterior uterine cavity, 11 o'clock, near fundus.  Benign-appearing.  Cervical canal normal.  After D&C, the uterine cavity appeared smooth, and the polyp had sufficiently been removed.      3.  On cystoscopy, the bladder mucosa was Normal.  After Lefort colpocleisis and sling, there was no suture or mesh within the bladder mucosa.  The ureteral orifices were visualized bilaterally with (+) noted good efflux x 2. On a systematic survey of the bladder dome to the base of the urethrovesical junction, there were not other abnormalities noted. The urethra was normal on retraction of the scope.    4.  Rectal exam:  At the close of the case, rectal exam was performed.  There was no suture, mesh, or other injury noted on exam.  No obvious masses were palpated.     Description of procedure:     The patient was identified in the preoperative area where informed consent was confirmed, and she was taken to the operating room where an adequate level of general  anesthesia was obtained.  The patient was positioned in lithotomy position with legs in Ajith stirrups. Care was taken to avoid joint hyperflexion or hyperextension, and all extremity surfaces were carefully padded so as to minimize risk of neurologic injury. Intravenous antibiotics were administered preoperatively. Sequential compression devices were applied to the patient's lower extremities preoperatively and heparin was administered subcutaneously for VTE prophylaxis.  Surgical time-out was performed, where the patient was identified and procedures confirmed.  An examination under anesthesia was performed with findings described as above.  The patient's abdomen, perineum, and vagina were sterilely prepped and draped. A calle catheter was placed in the bladder for drainage.      We started the procedure with hysteroscopy.  A weighted speculum was placed in the vagina, and the posterior cervix was grasped with a single toothed tenaculum.  The uterus was sounded to 7 cm, and the cervical canal was serially dilated to accommodate the 15 degree hysteroscope.  The scope was advanced into the uterine cavity with findings as noted above.  The scope was removed, and dilation and curettage was performed with a #1 Reynolds curet.  Endometrial curettings were collected on a telfa and submitted to pathology for further evaluation.  The hysteroscope was replaced, and normal findings were as noted above.  Of note, the endometrial polyp was cleared.  Previous D&C in 2017 was also benign.     Next, we proceeded with the LeFort colpocleisis.   The everted cervix was placed on traction with an Allis clamp.  A marking pen was used to fantasma areas anteriorly and posteriorly that were to be excised for the procedure. Anteriorly, a rectangular area was demarcated, approximately two centimeters proximal to the tip of the cervix extending approximately 8  centimeters toward the external urethral meatus.  This resulted in proximal and distal  boundaries on the anterior surface approximately two centimeters proximal to the tip of the cervix and four to five centimeters below the external urethral meatus. Laterally, this rectangle was extended to leave several centimeters of vaginal epithelium on either side that would accommodate passage of future uterovaginal secretions. After this rectangle was demarcated on the anterior cervix, attention was turned to the posterior cervix where usually, the same type of demarcation would be made.  However, it was noted that a significant area of granulation size, about the same size of the anterior rectangle, was noted, and removal of the epithelium along this area was unnecessary.  Of note, the vaginal epithelium, in general, was very friable and large amounts of granulation tissue were present from pessary use.     With anterior and posterior rectangles demarcated well for removal, the vaginal mucosa was injected with dilute 1% lidocaine + epinephrine just below the epithelium.  Starting with the anterior aspects, a scalpel was used to incise the vaginal epithelium along the proximal edge of the anterior rectangle, as the lateral aspects of this incision were grasped with Allis clamps.  Metzenbaum scissors were used to dissect the vaginal epithelium away from the underlying fibromuscular vaginal tissue.  We continued this process until the entire demarcated rectangle along the anterior aspect was removed. This tissue was then handed to pathology for further evaluation.  Excellent hemostasis was ensured after this segment was remioved using Bovie cautery.  Next, attention was turned to the posterior aspect of the prolapse.  Again, the proximal-most aspect of the posterior rectangle was grasped at the lateral margins with Allis clamps. A scalpel was used to make an incision along the vaginal epithelium. Metzenbaum scissors were used to dissect vaginal epithelium from the fibromuscular vaginal wall, removing the entire  aspect of the demarcated rectangle. Hemostasis was achieved using Bovie cautery after this.    With anterior and posterior rectangular areas of vaginal epithelium removed, several interrupted stitches of 0-vicryl were use to approximate the distal-most aspect of the anterior rectangle epithelial edge with the distal-most aspect of the posterior rectangle epithelial edge, effectually covering the cervix in it's entirety, while creating a small tunnel for fluid secretion just distal to the cervix.  Rows if interrupted stitches of 0-vicryl were then placed to approximate the superior and inferior edges of the remaining strips of epithelium along the right and left side.  This was done intermittently with serial rows of 0-vicryl through the fibromuscular tissue at the anterior and posterior rectangles so that even progress with reducing the prolapse was made centrally and laterally.  This was continued until the entire defect was reduced,  Leaving 2 tunnels bilaterally for drainage. The tunnels were large enough to pass a 20F catheter tip.      At this point, we proceeded with placement of midurethral sling. We placed the transobturator tension-free mid urethral sling:  Obtryx II. The vaginal epithelium underlying the mid urethra was grasped, and a subepithelial injection was made with 1% lidocaine with epinephrine.  A 1 cm vertical incision was made, and the underlying vesicovaginal fibromuscular connective tissue was dissected off of the vaginal epithelium bilaterally to the junction between the pubic bone with the pubic rami.  Before making skin incisions for entry of the vaginal sling, we palpated the edge of the ischiopubic ramus just inferior to the insertion of the adductor longus tendon.  A small stab incision was made in this area with a clean knife bilaterally.  We confirmed that the incisions were approximately the level of the clitoris.  The helical needle was then grasped and inserted into the stab wound  "made on the patient's right side.  The needle was inserted perpendicular to the skin, curving around the pubic ramus, towards an opposing index finger which was inserted under the vaginal epithelium. The mesh arm was then attached to the needle, and the needle was retracted back along its path of introduction. The steps were then repeated on the patient's left side which resulted in placement of the Obtryx II sling at the level of the midurethra.  After both mesh arms had been placed, a cystourethroscopy was performed with findings as noted above. There were no abnormalities or injury. The sling was then tensioned with a 10 mm Hegar dilator between the sling and the midurethra. The plastic sheaths were released along each sling arm by sharply transecting the stay suture.  The sheaths were removed, and the excess mesh arms were trimmed at the level of the skin.  The midurethral plastic midline marker was then removed sharply.  The groin sites were then closed with Dermabond. The vaginal epithelium was closed several mattress sutures of 2-0 Vicryl.  Excellent hemostasis was noted.    Finally, the perineoplasty was performed.  A "johnathan" of perineal skin was marked, extended from hymen remnant to hymen remnant and onto the perineum.  This was injected with dilute 1% lidocaine + epinephrine and excised sharply. We then marked and excised a "chimney", extending from the johnathan to the posterior aspect of the colpocleisis closure.  We then closed the deep tissues with interrupted sutures of 0 Vicryl, plicating the distal levator muscles and vagina.  This was continued distally to recreate the perineal body by approximating the bulbocavernosus and superficial/deep transverse perineal muscles 0-vicryl.  The bed of plication was irrigated, an excellent hemostasis was noted.  The epithelium from the colpocleisis and posterior repair was then closed in running, locking fashion with a 2-0 vicryl.  Excellent hemostasis was " noted.  There was approximately 3 centimeters depth from the hymenal ring to the area of the incision.  The vaginal hiatus was noted to be approximately 2 centimeters from the ureterovesical junction.  Each lateral canal was tested for patency, and were noted to be patent. Excellent hemostasis was noted.     At the close of the case, all counts were correct x2.  The vagina was irrigated, and all irrigants were removed.  The vagina was packed with a role of Kerlix, coated with Premarin cream for assurance of immediate postop hemostasis.  The Frazier was in place and draining well.  The patient was awakened from general endotracheal anesthesia and was taken to the Recovery Room in stable condition.  She tolerated the procedure well.    I was present and scrubbed for the entire procedure.

## 2018-09-10 NOTE — NURSING TRANSFER
Nursing Transfer Note      9/10/2018     Transfer To: 503[address]    Transfer via stretcher    Transported by PCT    Medicines sent: [facility]    Chart send with patient: Yes    Notified: daughter    Patient reassessed at: 9/10/18 @ 0006

## 2018-09-11 VITALS
SYSTOLIC BLOOD PRESSURE: 125 MMHG | HEIGHT: 62 IN | DIASTOLIC BLOOD PRESSURE: 58 MMHG | TEMPERATURE: 97 F | HEART RATE: 58 BPM | WEIGHT: 136 LBS | BODY MASS INDEX: 25.03 KG/M2 | OXYGEN SATURATION: 96 % | RESPIRATION RATE: 18 BRPM

## 2018-09-11 PROBLEM — I10 HTN (HYPERTENSION): Status: ACTIVE | Noted: 2018-09-11

## 2018-09-11 LAB
BASOPHILS # BLD AUTO: 0.02 K/UL
BASOPHILS NFR BLD: 0.2 %
DIFFERENTIAL METHOD: ABNORMAL
EOSINOPHIL # BLD AUTO: 0 K/UL
EOSINOPHIL NFR BLD: 0 %
ERYTHROCYTE [DISTWIDTH] IN BLOOD BY AUTOMATED COUNT: 13.3 %
HCT VFR BLD AUTO: 36 %
HGB BLD-MCNC: 11.7 G/DL
IMM GRANULOCYTES # BLD AUTO: 0.02 K/UL
IMM GRANULOCYTES NFR BLD AUTO: 0.2 %
LYMPHOCYTES # BLD AUTO: 1.3 K/UL
LYMPHOCYTES NFR BLD: 12.8 %
MCH RBC QN AUTO: 28.5 PG
MCHC RBC AUTO-ENTMCNC: 32.5 G/DL
MCV RBC AUTO: 88 FL
MONOCYTES # BLD AUTO: 0.5 K/UL
MONOCYTES NFR BLD: 5 %
NEUTROPHILS # BLD AUTO: 8.2 K/UL
NEUTROPHILS NFR BLD: 81.8 %
NRBC BLD-RTO: 0 /100 WBC
PLATELET # BLD AUTO: 207 K/UL
PMV BLD AUTO: 11 FL
RBC # BLD AUTO: 4.11 M/UL
WBC # BLD AUTO: 10.05 K/UL

## 2018-09-11 PROCEDURE — 36415 COLL VENOUS BLD VENIPUNCTURE: CPT

## 2018-09-11 PROCEDURE — 85025 COMPLETE CBC W/AUTO DIFF WBC: CPT

## 2018-09-11 PROCEDURE — 63600175 PHARM REV CODE 636 W HCPCS: Performed by: STUDENT IN AN ORGANIZED HEALTH CARE EDUCATION/TRAINING PROGRAM

## 2018-09-11 PROCEDURE — 25000003 PHARM REV CODE 250: Performed by: STUDENT IN AN ORGANIZED HEALTH CARE EDUCATION/TRAINING PROGRAM

## 2018-09-11 PROCEDURE — G0378 HOSPITAL OBSERVATION PER HR: HCPCS

## 2018-09-11 RX ORDER — HYDROCODONE BITARTRATE AND ACETAMINOPHEN 5; 325 MG/1; MG/1
1 TABLET ORAL EVERY 4 HOURS PRN
Qty: 15 TABLET | Refills: 0 | Status: SHIPPED | OUTPATIENT
Start: 2018-09-11 | End: 2018-10-16

## 2018-09-11 RX ORDER — DOCUSATE SODIUM 100 MG/1
100 CAPSULE, LIQUID FILLED ORAL 2 TIMES DAILY
Qty: 30 CAPSULE | Refills: 0 | Status: SHIPPED | OUTPATIENT
Start: 2018-09-11 | End: 2018-10-16

## 2018-09-11 RX ORDER — SIMETHICONE 80 MG
1 TABLET,CHEWABLE ORAL 3 TIMES DAILY PRN
Status: DISCONTINUED | OUTPATIENT
Start: 2018-09-11 | End: 2018-09-11 | Stop reason: HOSPADM

## 2018-09-11 RX ORDER — FAMOTIDINE 20 MG/1
20 TABLET, FILM COATED ORAL 2 TIMES DAILY
Status: DISCONTINUED | OUTPATIENT
Start: 2018-09-11 | End: 2018-09-11 | Stop reason: HOSPADM

## 2018-09-11 RX ORDER — AMLODIPINE BESYLATE 2.5 MG/1
2.5 TABLET ORAL DAILY
Status: DISCONTINUED | OUTPATIENT
Start: 2018-09-11 | End: 2018-09-11 | Stop reason: HOSPADM

## 2018-09-11 RX ORDER — IBUPROFEN 600 MG/1
600 TABLET ORAL EVERY 6 HOURS
Qty: 30 TABLET | Refills: 1 | Status: CANCELLED | OUTPATIENT
Start: 2018-09-11

## 2018-09-11 RX ORDER — IBUPROFEN 600 MG/1
600 TABLET ORAL EVERY 6 HOURS
Status: DISCONTINUED | OUTPATIENT
Start: 2018-09-11 | End: 2018-09-11 | Stop reason: HOSPADM

## 2018-09-11 RX ORDER — IBUPROFEN 600 MG/1
600 TABLET ORAL EVERY 6 HOURS
Qty: 30 TABLET | Refills: 1 | Status: SHIPPED | OUTPATIENT
Start: 2018-09-11 | End: 2019-01-08

## 2018-09-11 RX ADMIN — SIMETHICONE CHEW TAB 80 MG 80 MG: 80 TABLET ORAL at 01:09

## 2018-09-11 RX ADMIN — IBUPROFEN 600 MG: 600 TABLET ORAL at 05:09

## 2018-09-11 RX ADMIN — IBUPROFEN 600 MG: 600 TABLET ORAL at 01:09

## 2018-09-11 RX ADMIN — DEXTROSE MONOHYDRATE, SODIUM CHLORIDE, AND POTASSIUM CHLORIDE: 50; 4.5; 1.49 INJECTION, SOLUTION INTRAVENOUS at 04:09

## 2018-09-11 RX ADMIN — HEPARIN SODIUM 5000 UNITS: 5000 INJECTION, SOLUTION INTRAVENOUS; SUBCUTANEOUS at 03:09

## 2018-09-11 RX ADMIN — PROMETHAZINE HYDROCHLORIDE 12.5 MG: 25 INJECTION INTRAMUSCULAR; INTRAVENOUS at 04:09

## 2018-09-11 RX ADMIN — AMLODIPINE BESYLATE 2.5 MG: 2.5 TABLET ORAL at 08:09

## 2018-09-11 RX ADMIN — ONDANSETRON 8 MG: 8 TABLET, ORALLY DISINTEGRATING ORAL at 03:09

## 2018-09-11 RX ADMIN — HEPARIN SODIUM 5000 UNITS: 5000 INJECTION, SOLUTION INTRAVENOUS; SUBCUTANEOUS at 05:09

## 2018-09-11 RX ADMIN — DEXTROSE MONOHYDRATE, SODIUM CHLORIDE, AND POTASSIUM CHLORIDE: 50; 4.5; 1.49 INJECTION, SOLUTION INTRAVENOUS at 02:09

## 2018-09-11 RX ADMIN — ATENOLOL 50 MG: 50 TABLET ORAL at 08:09

## 2018-09-11 RX ADMIN — FAMOTIDINE 20 MG: 20 TABLET ORAL at 08:09

## 2018-09-11 RX ADMIN — DOCUSATE SODIUM 100 MG: 100 CAPSULE, LIQUID FILLED ORAL at 08:09

## 2018-09-11 NOTE — PROGRESS NOTES
[facility]  Obstetrics & Gynecology  Progress Note    Patient Name: Linda Felton  MRN: [id number]  Admission Date: 9/10/2018  Primary Care Provider: Bhargav Fernandez MD  Principal Problem: Uterovaginal prolapse, incomplete    Subjective:     HPI:  Pt is a 77 yo female with POP and ERIKA who presents for scheduled colpocleisis, midurethral sling, and hysteroscopy/d&c.    Hospital Course:  09/10/2018: Pt presented for scheduled surgery.  Underwent LeFort colpocleisis, transobturator midurethral sling, hysteroscopy/d&c, and posterior repair. Surgery was uncomplicated.   09/11/2018: Doing well postoperatively. Meeting postop milestones, stable for discharge after voiding trial.    Interval History: Pt doing well this am. Denies pain other than a sore throat - has not required pain medication over night. Does report some nausea but no vomiting. Has tolerated some food - soup/mashed potatoes. Is passing flatus. Reports mild vaginal bleeding.    Scheduled Meds:   amLODIPine  2.5 mg Oral Daily    atenolol  50 mg Oral Daily    docusate sodium  100 mg Oral BID    heparin (porcine)  5,000 Units Subcutaneous Q8H    ibuprofen  600 mg Oral Q6H    scopolamine  1 patch Transdermal Once     Continuous Infusions:   dextrose 5 % and 0.45 % NaCl with KCl 20 mEq 125 mL/hr at 09/11/18 0424     PRN Meds:bisacodyl, diphenhydrAMINE, diphenhydrAMINE, HYDROcodone-acetaminophen, HYDROcodone-acetaminophen, HYDROmorphone, ondansetron, promethazine (PHENERGAN) IVPB, simethicone    Review of patient's allergies indicates:   Allergen Reactions    Ivp dye  [iodinated contrast- oral and iv dye]      Other reaction(s): Rash    Penicillins      Other reaction(s): Rash    Sulfa (sulfonamide antibiotics)      Other reaction(s): Rash    Adhesive Rash       Objective:     Vital Signs (Most Recent):  Temp: 98.3 °F (36.8 °C) (09/11/18 0617)  Pulse: 61 (09/11/18 0617)  Resp: 20 (09/11/18 0617)  BP: (!) 165/66 (09/11/18  0617)  SpO2: (!) 93 % (09/11/18 0617) Vital Signs (24h Range):  Temp:  [97 °F (36.1 °C)-98.6 °F (37 °C)] 98.3 °F (36.8 °C)  Pulse:  [47-70] 61  Resp:  [16-29] 20  SpO2:  [93 %-100 %] 93 %  BP: (117-165)/(58-95) 165/66     Weight: 61.7 kg (136 lb 0.4 oz)  Body mass index is 24.88 kg/m².  No LMP recorded. Patient is postmenopausal.    I&O (Last 24H):    Intake/Output Summary (Last 24 hours) at 9/11/2018 0628  Last data filed at 9/11/2018 0600  Gross per 24 hour   Intake 3944 ml   Output 4050 ml   Net -106 ml       Physical Exam:   Constitutional: She is oriented to person, place, and time. She appears well-developed and well-nourished. No distress.    HENT:   Head: Normocephalic and atraumatic.       Pulmonary/Chest: Effort normal. No respiratory distress.        Abdominal: Soft. She exhibits no distension. There is no tenderness. There is no rebound and no guarding.     Genitourinary:   Genitourinary Comments: Packing removed, approx 70% saturated with blood. Incision sites c/d/i, covered in dermabond             Musculoskeletal: Moves all extremeties.       Neurological: She is alert and oriented to person, place, and time.    Skin: Skin is warm and dry. She is not diaphoretic.    Psychiatric: She has a normal mood and affect. Her behavior is normal. Judgment and thought content normal.       Laboratory:  Am CBC pending    Diagnostic Results:  n/a    Assessment/Plan:     * Uterovaginal prolapse, incomplete    --now s/p colpocleisis/posterior repair/cystoscopy/hysteroscopy/d&c  ----scheduled ibuprofen, PRN norco/dilaudid  ----encourage ambulation, SCDs  ----subQ heparin for DVT ppx  ----regular diet  ----passive void trial this am        HTN (hypertension)    --BP: (117-165)/(58-95) 165/66  --continue home atenolol, norvasc  --holding home HCTZ          DISPO: anticipate discharge home today.    Elgin Peoples MD  Obstetrics & Gynecology  [facility]

## 2018-09-11 NOTE — ASSESSMENT & PLAN NOTE
--now s/p colpocleisis/posterior repair/cystoscopy/hysteroscopy/d&c  ----scheduled ibuprofen, PRN norco/dilaudid  ----encourage ambulation, SCDs  ----subQ heparin for DVT ppx  ----regular diet  ----passive void trial this am

## 2018-09-11 NOTE — SUBJECTIVE & OBJECTIVE
Interval History: Pt doing well this am. Denies pain other than a sore throat - has not required pain medication over night. Does report some nausea but no vomiting. Has tolerated some food - soup/mashed potatoes. Is passing flatus. Reports mild vaginal bleeding.    Scheduled Meds:   amLODIPine  2.5 mg Oral Daily    atenolol  50 mg Oral Daily    docusate sodium  100 mg Oral BID    heparin (porcine)  5,000 Units Subcutaneous Q8H    ibuprofen  600 mg Oral Q6H    scopolamine  1 patch Transdermal Once     Continuous Infusions:   dextrose 5 % and 0.45 % NaCl with KCl 20 mEq 125 mL/hr at 09/11/18 0424     PRN Meds:bisacodyl, diphenhydrAMINE, diphenhydrAMINE, HYDROcodone-acetaminophen, HYDROcodone-acetaminophen, HYDROmorphone, ondansetron, promethazine (PHENERGAN) IVPB, simethicone    Review of patient's allergies indicates:   Allergen Reactions    Ivp dye  [iodinated contrast- oral and iv dye]      Other reaction(s): Rash    Penicillins      Other reaction(s): Rash    Sulfa (sulfonamide antibiotics)      Other reaction(s): Rash    Adhesive Rash       Objective:     Vital Signs (Most Recent):  Temp: 98.3 °F (36.8 °C) (09/11/18 0617)  Pulse: 61 (09/11/18 0617)  Resp: 20 (09/11/18 0617)  BP: (!) 165/66 (09/11/18 0617)  SpO2: (!) 93 % (09/11/18 0617) Vital Signs (24h Range):  Temp:  [97 °F (36.1 °C)-98.6 °F (37 °C)] 98.3 °F (36.8 °C)  Pulse:  [47-70] 61  Resp:  [16-29] 20  SpO2:  [93 %-100 %] 93 %  BP: (117-165)/(58-95) 165/66     Weight: 61.7 kg (136 lb 0.4 oz)  Body mass index is 24.88 kg/m².  No LMP recorded. Patient is postmenopausal.    I&O (Last 24H):    Intake/Output Summary (Last 24 hours) at 9/11/2018 0628  Last data filed at 9/11/2018 0600  Gross per 24 hour   Intake 3944 ml   Output 4050 ml   Net -106 ml       Physical Exam:   Constitutional: She is oriented to person, place, and time. She appears well-developed and well-nourished. No distress.    HENT:   Head: Normocephalic and atraumatic.        Pulmonary/Chest: Effort normal. No respiratory distress.        Abdominal: Soft. She exhibits no distension. There is no tenderness. There is no rebound and no guarding.     Genitourinary:   Genitourinary Comments: Packing removed, approx 70% saturated with blood. Incision sites c/d/i, covered in dermabond             Musculoskeletal: Moves all extremeties.       Neurological: She is alert and oriented to person, place, and time.    Skin: Skin is warm and dry. She is not diaphoretic.    Psychiatric: She has a normal mood and affect. Her behavior is normal. Judgment and thought content normal.       Laboratory:  Am CBC pending    Diagnostic Results:  n/a

## 2018-09-11 NOTE — DISCHARGE SUMMARY
[facility]  Obstetrics & Gynecology  Discharge Summary    Patient Name: Linda Felton  MRN: [id number]  Admission Date: 9/10/2018  Hospital Length of Stay: 0 days  Discharge Date and Time:  09/11/2018 5:08 PM  Attending Physician: Felicia Gates MD   Discharging Provider: Elgin Peoples MD  Primary Care Provider: Bhargav Fernandez MD    HPI:  Pt is a 79 yo female with POP and ERIKA who presents for scheduled colpocleisis, midurethral sling, and hysteroscopy/d&c.    Hospital Course:  09/10/2018: Pt presented for scheduled surgery.  Underwent LeFort colpocleisis, transobturator midurethral sling, hysteroscopy/d&c, and posterior repair. Surgery was uncomplicated.   09/11/2018: Doing well postoperatively. Meeting postop milestones, stable for discharge after voiding trial.    Void trial:   First void 200cc, bladder scan ~400 cc  Repeat void 300cc, bladder scan ~20 cc    On POD#1, patient is meeting all postoperative milestones and is ready for discharge. Pain is well-controlled with PO pain medications. Patient is tolerating PO without nausea or vomiting, ambulating and urinating without difficulty, and passing flatus. Patient instructed to follow up with Dr. Gates as scheduled in 6 weeks for postoperative visit.      Procedure(s) (LRB):  COLPOCLEISIS (N/A)  SLING, MIDURETHRAL (N/A)  CYSTOSCOPY (N/A)  HYSTEROSCOPY, WITH DILATION AND CURETTAGE OF UTERUS       Significant Diagnostic Studies: Labs:   CBC   Recent Labs   Lab  09/11/18   0420   WBC  10.05   HGB  11.7*   HCT  36.0*   PLT  207       Pending Diagnostic Studies:     None        Final Active Diagnoses:    Diagnosis Date Noted POA    PRINCIPAL PROBLEM:  Uterovaginal prolapse, incomplete [N81.2] 09/10/2018 Yes    HTN (hypertension) [I10] 09/11/2018 Yes      Problems Resolved During this Admission:        Discharged Condition: good    Disposition:     Follow Up:  Follow-up Information     Felicia Gates MD. Go on 10/23/2018.     Specialties:  Gynecology, Urology  Why:  post op check (already scheduled)  Contact information:  [address]  [facility] [id number]  [phone]                 Patient Instructions:      Diet Adult Regular     Other restrictions (specify):   Order Comments: Nothing in the vagina - no intercourse, no tampons, etc     Notify your health care provider if you experience any of the following:  temperature >100.4     Notify your health care provider if you experience any of the following:  persistent nausea and vomiting or diarrhea     Notify your health care provider if you experience any of the following:  severe uncontrolled pain     Notify your health care provider if you experience any of the following:  redness, tenderness, or signs of infection (pain, swelling, redness, odor or green/yellow discharge around incision site)     Notify your health care provider if you experience any of the following:  difficulty breathing or increased cough     Notify your health care provider if you experience any of the following:  severe persistent headache     Notify your health care provider if you experience any of the following:  increased confusion or weakness     Notify your health care provider if you experience any of the following:  worsening rash     Notify your health care provider if you experience any of the following:  persistent dizziness, light-headedness, or visual disturbances     Notify your health care provider if you experience any of the following:   Order Comments: Heavy vaginal bleeding     Activity as tolerated     Medications:  Reconciled Home Medications:      Medication List      START taking these medications    docusate sodium 100 MG capsule  Commonly known as:  COLACE  Take 1 capsule (100 mg total) by mouth 2 (two) times daily.     HYDROcodone-acetaminophen 5-325 mg per tablet  Commonly known as:  NORCO  Take 1 tablet by mouth every 4 (four) hours as needed.     ibuprofen 600 MG  tablet  Commonly known as:  ADVIL,MOTRIN  Take 1 tablet (600 mg total) by mouth every 6 (six) hours.        CONTINUE taking these medications    amLODIPine 2.5 MG tablet  Commonly known as:  NORVASC  Take 2.5 mg by mouth once daily.     aspirin-sod bicarb-citric acid 324 mg Tbef  Commonly known as:  CHNAO-SELTZER  Take 325 mg by mouth once daily. Patient takes 1/4 tablet for upset stomach     atenolol 50 MG tablet  Commonly known as:  TENORMIN  Take by mouth. 1 Tablet Oral Every day     conjugated estrogens vaginal cream  Commonly known as:  PREMARIN  Place 0.5 g vaginally twice a week.     CREON 24,000-76,000 -120,000 unit capsule  Generic drug:  lipase-protease-amylase 24,000-76,000-120,000 units     dicyclomine 10 MG capsule  Commonly known as:  BENTYL     FLUZONE HIGH-DOSE 2017-18 (PF) 180 mcg/0.5 mL vaccine  Generic drug:  influenza     hydroCHLOROthiazide 25 MG tablet  Commonly known as:  HYDRODIURIL  Take 25 mg by mouth once daily.     meloxicam 15 MG tablet  Commonly known as:  MOBIC  Take 15 mg by mouth once daily.            Elgin Peoples MD  Obstetrics & Gynecology  [facility]

## 2018-09-11 NOTE — HOSPITAL COURSE
09/10/2018: Pt presented for scheduled surgery.  Underwent LeFort colpocleisis, transobturator midurethral sling, hysteroscopy/d&c, and posterior repair. Surgery was uncomplicated.   09/11/2018: Doing well postoperatively. Meeting postop milestones, stable for discharge after voiding trial.

## 2018-09-11 NOTE — PLAN OF CARE
CM met with patient and family to obtain discharge planning assessment.  Patient is POD 1 for hysteroscopy with dilation and curettage and polypectomy.  Planned discharge is home with family - Plan (A) and home with family and home health - Plan (B).    PCP:  Bhargav Fernandez MD     Payor:  Payor: [employer] MANAGED MEDICARE / Plan: HU[employer] MEDICARE HMO / Product Type: Capitation /      Pharmacy:    [facility] #[id number] - [place] - [address]  [address]  [place] [address]  Phone: [phone] Fax: [phone]       09/11/18 1406   Discharge Assessment   Assessment Type Discharge Planning Assessment   Confirmed/corrected address and phone number on facesheet? Yes   Assessment information obtained from? Patient   Expected Length of Stay (days) 1   Communicated expected length of stay with patient/caregiver yes   Prior to hospitilization cognitive status: Alert/Oriented   Prior to hospitalization functional status: Independent   Current cognitive status: Alert/Oriented   Current Functional Status: Independent   Lives With spouse   Able to Return to Prior Arrangements yes   Is patient able to care for self after discharge? Yes   Who are your caregiver(s) and their phone number(s)? Georoge - spouse [phone]   Patient's perception of discharge disposition home or selfcare   Readmission Within The Last 30 Days no previous admission in last 30 days   Patient currently being followed by outpatient case management? No   Patient currently receives any other outside agency services? No   Equipment Currently Used at Home none   Do you have any problems affording any of your prescribed medications? No   Is the patient taking medications as prescribed? yes   Does the patient have transportation home? Yes   Transportation Available family or friend will provide   Does the patient receive services at the [facility] Clinic? No   Discharge Plan A Home with family   Discharge Plan B Home with family;Home  Health   Patient/Family In Agreement With Plan yes

## 2018-09-11 NOTE — HPI
Pt is a 79 yo female with POP and ERIKA who presents for scheduled colpocleisis, midurethral sling, and hysteroscopy/d&c.

## 2018-09-12 ENCOUNTER — TELEPHONE (OUTPATIENT)
Dept: UROGYNECOLOGY | Facility: CLINIC | Age: 78
End: 2018-09-12

## 2018-09-12 DIAGNOSIS — R11.0 NAUSEA: Primary | ICD-10-CM

## 2018-09-12 RX ORDER — ONDANSETRON HYDROCHLORIDE 8 MG/1
8 TABLET, FILM COATED ORAL EVERY 8 HOURS PRN
Qty: 12 TABLET | Refills: 0 | Status: SHIPPED | OUTPATIENT
Start: 2018-09-12 | End: 2018-10-16

## 2018-09-12 NOTE — TELEPHONE ENCOUNTER
----- Message from Umesh Ag sent at 9/12/2018 10:30 AM CDT -----  Contact: YUE CLOUD [[id number]]            Name of Who is Calling: YUE CLOUD [[id number]]      What is the request in detail: Patient would like to speak with staff in regards to medication that was prescribed after her procedure      Can the clinic reply by [facility]: no      [facility] [phone]

## 2018-09-12 NOTE — PLAN OF CARE
Patient discharged home.  The patient does not have any home needs.  Family provided transportation home.    Future Appointments   Date Time Provider Department Center   10/10/2018  9:00 AM LAB, APPOINTMENT [place] [facility] LAB [facility]   10/10/2018 10:00 AM VASCULAR, LAB [facility] VASCLAB [facility]   10/16/2018  2:20 PM Shonda Curtis MD [facility] HEPAT [facility]   10/23/2018  1:30 PM Madisyn Ramires NP [facility] UROGYN [facility]        09/12/18 0849   Final Note   Assessment Type Final Discharge Note   Discharge Disposition Home   Hospital Follow Up  Appt(s) scheduled? Yes   Discharge plans and expectations educations in teach back method with documentation complete? Yes

## 2018-09-12 NOTE — TELEPHONE ENCOUNTER
Spoke to pt and discussed the medications prescribed for her and what they're used for. Pt also states she's having some nausea and would like to know what she could do about that. Pt denies vomiting, fever or pain.  I informed the pt I'd relay this message to and Dr. Gates and someone would get back to her. Pt voiced understanding and call ended.

## 2018-09-12 NOTE — ANESTHESIA POSTPROCEDURE EVALUATION
"Anesthesia Post Evaluation    Patient: Linda Felton    Procedure(s) Performed: Procedure(s) (LRB):  COLPOCLEISIS (N/A)  SLING, MIDURETHRAL (N/A)  CYSTOSCOPY (N/A)  HYSTEROSCOPY, WITH DILATION AND CURETTAGE OF UTERUS    Final Anesthesia Type: general  Patient location during evaluation: PACU  Patient participation: Yes- Able to Participate  Level of consciousness: awake and alert  Post-procedure vital signs: reviewed and stable  Pain management: adequate  Airway patency: patent  PONV status at discharge: No PONV  Anesthetic complications: no      Cardiovascular status: stable  Respiratory status: unassisted and spontaneous ventilation  Hydration status: euvolemic  Follow-up not needed.        Visit Vitals  BP (!) 125/58 (BP Location: Right arm, Patient Position: Sitting)   Pulse (!) 58   Temp 36.2 °C (97.2 °F) (Oral)   Resp 18   Ht 5' 2" (1.575 m)   Wt 61.7 kg (136 lb 0.4 oz)   SpO2 96%   Breastfeeding? No   BMI 24.88 kg/m²       Pain/Rosita Score: Pain Assessment Performed: Yes (9/11/2018  2:00 PM)  Presence of Pain: denies (9/11/2018  2:00 PM)  Pain Rating Prior to Med Admin: 0 (9/11/2018  5:31 PM)        "

## 2018-10-10 ENCOUNTER — HOSPITAL ENCOUNTER (OUTPATIENT)
Dept: VASCULAR SURGERY | Facility: CLINIC | Age: 78
Discharge: HOME | End: 2018-10-10
Attending: INTERNAL MEDICINE
Payer: MEDICARE

## 2018-10-10 DIAGNOSIS — B96.81 HELICOBACTER PYLORI GASTRITIS: ICD-10-CM

## 2018-10-10 DIAGNOSIS — R10.9 ABDOMINAL PAIN: ICD-10-CM

## 2018-10-10 DIAGNOSIS — I70.0 ATHEROSCLEROSIS OF ABDOMINAL AORTA: ICD-10-CM

## 2018-10-10 DIAGNOSIS — K29.70 HELICOBACTER PYLORI GASTRITIS: ICD-10-CM

## 2018-10-10 DIAGNOSIS — E55.9 VITAMIN D INSUFFICIENCY: Primary | ICD-10-CM

## 2018-10-10 PROCEDURE — 93975 VASCULAR STUDY: CPT | Mod: 26,S$PBB,, | Performed by: SURGERY

## 2018-10-10 PROCEDURE — 93975 VASCULAR STUDY: CPT | Mod: PBBFAC | Performed by: SURGERY

## 2018-10-10 RX ORDER — ACETAMINOPHEN 500 MG
1 TABLET ORAL DAILY
Qty: 90 CAPSULE | Refills: 3 | COMMUNITY
Start: 2018-10-10 | End: 2019-01-08

## 2018-10-10 RX ORDER — ERGOCALCIFEROL 1.25 MG/1
50000 CAPSULE ORAL
Qty: 8 CAPSULE | Refills: 0 | Status: SHIPPED | OUTPATIENT
Start: 2018-10-10 | End: 2018-12-04 | Stop reason: SDUPTHER

## 2018-10-12 ENCOUNTER — TELEPHONE (OUTPATIENT)
Dept: GASTROENTEROLOGY | Facility: CLINIC | Age: 78
End: 2018-10-12

## 2018-10-12 NOTE — TELEPHONE ENCOUNTER
----- Message from Perez White MD sent at 10/10/2018 10:30 AM CDT -----  VERY IMPORTANT:    Ashley - please tell Linda that her potassium level is rather low and she needs follow up of this this week with her primary care MD or her primary's partner and could be related to her High blood pressure medicine that is a diuretic.    Ashley - Please tell patient that their Vitamin D level is low and recommend that they take Vitamin D 50,000 units once a week for 8 weeks and then start Vitamin D3 (2,000 units) over-the-counter once daily.     Ashley - please recheck their vitamin D level in 4 months - Orders placed.

## 2018-10-12 NOTE — TELEPHONE ENCOUNTER
----- Message from Perez White MD sent at 10/10/2018 10:28 AM CDT -----  Ashley - please tell Linda that her VAS US Mesenteric Arterial was read as good.    Impression:   Color flow duplex exam reveals a patent abdominal aorta, celiac artery, superior mesenteric artery and inferior mesenteric artery without evidence of a hemodynamically significant stenosis.

## 2018-10-16 ENCOUNTER — OFFICE VISIT (OUTPATIENT)
Dept: HEPATOLOGY | Facility: CLINIC | Age: 78
End: 2018-10-16
Payer: MEDICARE

## 2018-10-16 VITALS
SYSTOLIC BLOOD PRESSURE: 164 MMHG | TEMPERATURE: 98 F | HEART RATE: 69 BPM | DIASTOLIC BLOOD PRESSURE: 69 MMHG | BODY MASS INDEX: 25.4 KG/M2 | HEIGHT: 62 IN | WEIGHT: 138 LBS | OXYGEN SATURATION: 98 % | RESPIRATION RATE: 18 BRPM

## 2018-10-16 DIAGNOSIS — K76.89 LIVER CYST: Primary | ICD-10-CM

## 2018-10-16 PROCEDURE — 99999 PR PBB SHADOW E&M-EST. PATIENT-LVL IV: CPT | Mod: PBBFAC,,, | Performed by: INTERNAL MEDICINE

## 2018-10-16 PROCEDURE — 99214 OFFICE O/P EST MOD 30 MIN: CPT | Mod: PBBFAC | Performed by: INTERNAL MEDICINE

## 2018-10-16 PROCEDURE — 99213 OFFICE O/P EST LOW 20 MIN: CPT | Mod: S$PBB,,, | Performed by: INTERNAL MEDICINE

## 2018-10-16 PROCEDURE — 1101F PT FALLS ASSESS-DOCD LE1/YR: CPT | Mod: CPTII,,, | Performed by: INTERNAL MEDICINE

## 2018-10-16 NOTE — PATIENT INSTRUCTIONS
Recommendations:  -  Ultrasound abd limited to evaluate liver cyst - schedule next Tuesday 10/23/18, and every year thereafter.   -  Return in 1 year with report of U/s abd.

## 2018-10-16 NOTE — Clinical Note
Recommendations:-  Ultrasound abd limited to evaluate liver cyst - schedule next Tuesday 10/23/18, and every year thereafter. -  Return in 1 year with report of U/s abd.

## 2018-10-16 NOTE — LETTER
October 16, 2018      Perez White MD  [address]  [place] [address]           [facility] - Hepatology  [address]  [place] [address]  Phone: [phone]  Fax: [phone]          Patient: Linda Felton   MR Number: [id number]   YOB: 1940   Date of Visit: 10/16/2018       Dear Dr. Perez White:    Thank you for referring Linda Felton to me for evaluation. Attached you will find relevant portions of my assessment and plan of care.    If you have questions, please do not hesitate to call me. I look forward to following Linda Felton along with you.    Sincerely,    Shonda Curtis MD    Enclosure  CC:  No Recipients    If you would like to receive this communication electronically, please contact [email or web address] or [phone] to request more information on [employer] access.    For providers and/or their staff who would like to refer a patient to [facility], please contact us through our one-stop-shop provider referral line, [facility] [place], at [phone].    If you feel you have received this communication in error or would no longer like to receive these types of communications, please e-mail [email or web address]

## 2018-10-16 NOTE — PROGRESS NOTES
[facility] Follow-up Note    Reason for follow-up:  The encounter diagnosis was Liver cyst.    PCP: Bhargav Fernandez       HPI:  This is a 78 y.o. female here for evaluation of: liver cyst    Liver cyst seen incidentally on CT renal stone study on 7/6/12 (3.5 yrs ago).  Ultrasound of abd 4/2016 showed a larger cyst 9.4 x 7.2 x 8.1 cm is consistent with a simple cyst.  Later on 8/23/17, she had a CT scan to evaluate abd discomfort, and the cyst was measured at 12 cm. She is now re-sent here for cyst eval.      Abd pain is lower abd, suprapubic area, every time after a bowel movement, has lower abd pain, has to lay down for half hour then pain starts to dissipate.  She was given a pain pill, but she has not taken it.  Denies constipation, has 2-3 stools in the morning.  Denies hematochezia.        Noncontrast CT renal stone study from 2012:  The noncontrast appearance of the liver demonstrates a low attenuating lesion at the tip of the right lobe measuring 5.4 x 6 .0 x 6.9 cm, could represent a liver cyst, this could be confirmed with ultrasound.      Ultrasound of abd 4/2016 showed a larger cyst 9.4 x 7.2 x 8.1 cm is consistent with a simple cyst.  CT abd on 8/23/18 showed enlarging hepatic cyst To 12 cm.     Occasional pain over the RUQ, wears a support belt across the upper abdomen while using treadmill during exercise.  Dr. Fernandez, PCP, did another u/s in his office in [place], and it was the same size as previous u/s study. No bleeding in the cyst.          ROS:  Constitutional: No fevers, chills, weight changes, fatigue  ENT: No allergies, nosebleeds,   CV: No chest pain  Pulm: No cough, shortness of breath  Ophtho: No vision changes  GI/Liver: see HPI, has reflux (acid), burning retrosternal pain.    Derm: No rash, itching  Heme: No swollen glands, bruising  MSK: No joint pains, joint swelling  : No dysuria, hematuria, decrease in urine output  Endo: No hot or cold intolerance  Neuro: No  confusion, disorientation, difficulty with sleep, memory, concentration, syncope, seizure  Psych: No anxiety, depression    Medical History:  has a past medical history of Arthritis, Diverticulitis, and Hypertension.    Surgical History:  has a past surgical history that includes Cholecystectomy; kidney stones removed; Hernia repair; COLPOCLEISIS (N/A, 9/10/2018); SLING, MIDURETHRAL (N/A, 9/10/2018); CYSTOSCOPY (N/A, 9/10/2018); HYSTEROSCOPY, WITH DILATION AND CURETTAGE OF UTERUS (9/10/2018); ESOPHAGOGASTRODUODENOSCOPY (EGD) (N/A, 10/5/2015); and COLONOSCOPY (N/A, 10/5/2015).    Family History: family history includes Breast cancer in her maternal aunt; Colon cancer (age of onset: 60) in her sister; Hypertension in her mother; Seizures in her paternal uncle..     Social History:  reports that  has never smoked. she has never used smokeless tobacco. She reports that she does not drink alcohol or use drugs.    Review of patient's allergies indicates:   Allergen Reactions    Ivp dye  [iodinated contrast media - iv dye]      Other reaction(s): Rash    Penicillins      Other reaction(s): Rash    Sulfa (sulfonamide antibiotics)      Other reaction(s): Rash       Current Outpatient Rx   Name  Route  Sig  Dispense  Refill    atenolol (TENORMIN) 50 MG tablet    Oral    Take by mouth. 1 Tablet Oral Every day              conjugated estrogens (PREMARIN) vaginal cream        1 g with applicator or dime-sized amt with finger in vagina nightly x 2 weeks, then twice a week thereafter    30 g    12      olmesartan-hydrochlorothiazide (BENICAR HCT) 40-25 mg per tablet    Oral    Take 1 tablet by mouth once daily.              DISCONTD: esomeprazole (NEXIUM) 40 MG capsule    Oral    Take 1 capsule (40 mg total) by mouth before breakfast.    90 capsule    3      DISCONTD: hydrochlorothiazide (HYDRODIURIL) 25 MG tablet    Oral    Take 25 mg by mouth once daily.        0         Objective Findings:    Vital Signs:  BP (!) 164/69  "(BP Location: Left arm, Patient Position: Sitting, BP Method: Medium (Automatic))   Pulse 69   Temp 97.8 °F (36.6 °C) (Oral)   Resp 18   Ht 5' 2" (1.575 m)   Wt 62.6 kg (138 lb 0.1 oz)   SpO2 98%   BMI 25.24 kg/m²   Body mass index is 25.24 kg/m².    Physical Exam:  General Appearance: Well appearing in no acute distress  Head:   Normocephalic, without obvious abnormality  Eyes:    No scleral icterus, EOMI  ENT: Neck supple, Lips, mucosa, and tongue normal; teeth and gums normal  Lungs: CTA bilaterally in anterior and posterior fields, no wheezes, no crackles.  Heart:  Regular rate and rhythm, S1, S2 normal, no murmurs heard  Abdomen: Soft, non tender, non distended with positive bowel sounds in all four quadrants. No hepatosplenomegaly, ascites, or mass  Extremities: 2+ pulses, no clubbing, cyanosis or edema  Skin: No rash  Neurologic: CN II-XII intact, alert, oriented x 3. No asterixis.      Labs:  Lab Results   Component Value Date    WBC 6.30 10/10/2018    HGB 12.9 10/10/2018    HCT 39.0 10/10/2018     10/10/2018    CREATININE 0.8 10/10/2018    BUN 21 10/10/2018    BILITOT 1.1 (H) 10/10/2018    ALT 13 10/10/2018    AST 16 10/10/2018    ALKPHOS 95 10/10/2018     10/10/2018    K 2.8 (L) 10/10/2018     10/10/2018    CO2 29 10/10/2018    TSH 0.760 04/10/2015       Imaging:   As above. Old noncon CT  U/s 4/2016    Endoscopy:    EGD, colonoscopy    Assessment:  1. Liver cyst       Incidental findings on non-contrast CT renal stone study showed:  low attenuating lesion at the tip of the right lobe measuring 5.4 x 6 .0 x 6.9 cm, could represent a liver cyst, this was confirmed with ultrasound.   Follow-up ultrasound 4/13/16: The cystic mass measures 9.4 x 7.2 x 8.1 cm is consistent with a simple cyst.  CT abd on 8/23/18 showed enlarging hepatic cyst To 12 cm.  Has grown about 2.6 cm.      -  GERD symptoms, patient not taking PPI because she saw on TV it is bad for you.      Recommendations:  -  " Ultrasound abd limited to evaluate liver cyst - schedule next Tuesday 10/23/18, and every year thereafter.   -  Return in 1 year with report of U/s abd.     Follow-up in about 1 year (around 10/16/2019).      Order summary:  Orders Placed This Encounter   Procedures    US Abdomen Limited       Thank you so much for allowing me to participate in the care of Linda Sarah Curtis MD

## 2018-10-19 DIAGNOSIS — Z86.39 HISTORY OF LOW POTASSIUM: Primary | ICD-10-CM

## 2018-10-23 ENCOUNTER — TELEPHONE (OUTPATIENT)
Dept: GASTROENTEROLOGY | Facility: CLINIC | Age: 78
End: 2018-10-23

## 2018-10-23 ENCOUNTER — OFFICE VISIT (OUTPATIENT)
Dept: UROGYNECOLOGY | Facility: CLINIC | Age: 78
End: 2018-10-23
Payer: MEDICARE

## 2018-10-23 VITALS
HEIGHT: 62 IN | BODY MASS INDEX: 25.11 KG/M2 | SYSTOLIC BLOOD PRESSURE: 122 MMHG | WEIGHT: 136.44 LBS | DIASTOLIC BLOOD PRESSURE: 60 MMHG

## 2018-10-23 DIAGNOSIS — Z98.890 POST-OPERATIVE STATE: Primary | ICD-10-CM

## 2018-10-23 DIAGNOSIS — N95.2 VAGINAL ATROPHY: ICD-10-CM

## 2018-10-23 PROCEDURE — 99999 PR PBB SHADOW E&M-EST. PATIENT-LVL III: CPT | Mod: PBBFAC,,, | Performed by: NURSE PRACTITIONER

## 2018-10-23 PROCEDURE — 99213 OFFICE O/P EST LOW 20 MIN: CPT | Mod: PBBFAC | Performed by: NURSE PRACTITIONER

## 2018-10-23 PROCEDURE — 99024 POSTOP FOLLOW-UP VISIT: CPT | Mod: ,,, | Performed by: NURSE PRACTITIONER

## 2018-10-23 NOTE — PATIENT INSTRUCTIONS
1. Post op still healing.  2. Use estrogen cream with your finger just inside the vagina  3. Reviewed pathology with patient  4. She will follow up with us in 4 weeks.

## 2018-10-23 NOTE — TELEPHONE ENCOUNTER
Pt was informed of test results on 10/12. She said that she will see her  PCP about her low potassium.

## 2018-10-23 NOTE — PROGRESS NOTES
Urogyn follow up  10/27/2018    [facility]  [address]  [facility] [address]    Linda Felton  [id number]  1940      Linda Felton is a 78 y.o.  here for a urogyn follow up post op visit.    09/10/2018    Title of Operation:  1)  Hysteroscopy with dilation and curettage/polypectomy  2)  LeFort colpocleisis with colpectomy  3)  Placement of transobturator tension-free midurethral sling, Obtryx II ([employer])  4)  Perineorrhaphy with high levator plication     Indications for Surgery:     1)  UI:  (--) ERIKA   (+) UUI    (+) pads:3/day, usually moderate wetness.  Daytime frequency: Q 3 hours.  Nocturia: NO.   (--) dysuria,  (--) hematuria,  (--) frequent UTIs.  (+) complete bladder emptying.      2)  POP:  Present. below introitus.  Symptoms:(--)    (--) vaginal bleeding. (+) vaginal discharge- scant pink discharge (--) sexually active.  (--)  Vaginal dryness.  (+) vaginal estrogen use.      3)  BM:  (--) constipation/straining.  (--) chronic diarrhea. (--) hematochezia.  (--) fecal incontinence.  (--) fecal smearing/urgency.  (--) incomplete evacuation.  Not taking fiber regularly.    --has diverticulosis (with h/o diverticulitis)--trying to watch irritants (cheese, peanut butter)  --symptoms have been better-- denies diarrhea   --Patient had C. Diff and  + H. Pylori infections   --had incarcerated hernia in      4)pessary:  Denies pain or bleeding. Using #4 ring with support.     5)Patient had C. Diff and  + H. Pylori infections - followed by GI  Still has + abdominal pain and loose frequent stools.  Intermittent diarrhea alternating with constipation.           12/2016  The uterus measures 7.1 x 3.3 x 3.8 cm. Uterine parenchyma is homogeneous with evidence of a few uterine fibroids.  One appears partially calcified and measures 0.7 x 0.6 x 0.3 cm.  The largest is in the myometrium of the uterine fundus measuring 1.6 x 1.6 x 1.6 cm.  Additionally there is a new fibroid  anteriorly measuring 0.9 x 0.7 x 0.9 cm. The endometrial stripe is thickened and measures 0.7 cm.    The right ovary is normal in size and measures 1.1 1.0 x 1.2 cm. The left ovary is normal in size and measures 1.1 x 0.4 x 1.2 cm. Follicles are seen in both ovaries. Arterial and venous flow are preserved bilaterally with resistive indices of 0.7 on the right and 0.75 on the left. No free fluid is seen.   Impression      The uterus demonstrates presence of a few fibroids, 2 of which are stable in appearance, and 1 new fibroid.  There is endometrial thickening, underlying pathology cannot be excluded.  Consider further evaluation.  This report has been flagged in the Epic medical record.      03/14/2017  embx  ATROPHIC ENDOMETRIUM.  NO HYPERPLASIA OR MALIGNANCY IDENTIFIED     09/07/2017  Suds  1.  VOIDING PHASE:       a.  Uroflowmetry:  Not performed.       b.  Pressure flow:  · Prolapse reduction: Yes (pessary)0  · Voided volume:   458 mL  · Voiding time:   507 seconds  · Peak flow:  9 mL/s   · Avg flow:  2 mL/s  · Max det pressure:  36  cm H20  · Det pressure at max flow: 29 cm H20  · Void initiated by unable to discern as pves was dislodged during study.    · Urethral relaxation (EMG):  absent.    · PVR (calculated):  0 mL     The overall configuration of this pressure flow study was prolonged with indeterminate mechanism of void.       2.  FILLING PHASE:  · 1st desire: 98 mL  · Normal desire:  214 mL  · Strong desire:  329 mL  · Urgency:  397 mL  · Compliance (calculated)  ~20 mL/cm H20  · EMG activity during filling:  stable  · Detrusor contractions observed: No.      3.  URETHRAL FUNCTION/STORAGE PHASE:     a.  WITH prolapse reduction:  · CLPP (150 mL): Negative  at  130 cm H20  · VLPP (150 mL): Negative  at  76 cm H20   · CLPP (300 mL): Positive  at  150 cm H20  · VLPP (300 mL): Positive  at  138 cm H20   · CLPP (MAX ):    Positive  at  128 cm H20  · VLPP (MAX):     Positive  at  82 cm H20     These findings  are consistent with Positive urodynamic stress incontinence.     Assessment:  UF not completed.  PF prolonged with indeterminate mechanism of void.  Compliance low normal.  Max capacity 458 mL.  DO (--).  ERIKA (+).       Cysto    Findings: Urethroscopy:  Normal with mild decrease in coaptation.  Cystoscopy:  Normal bladder mucosa, bilateral ureteral flow was noted.     Assessment: Essentially normal cystourethroscopy with mild decrease in urethral coaptation     TODAY:  1)  Mixed urinary incontinence, urge > stress/prolapse:    --no increase in UUI/ ERIKA.  Using  #4 ring with support pessary      2)Vaginal atrophy (dryness):    --using esrogen cream and trimosan     3) Loose stool:  --imporved today  --does have intermittent lower abdominal cramping       4) RUQ pain/ liver cyst  --followed by hepatology     5)  Complete uterovaginal prolapse:  --ready to schedule surgery  --continue pessary use  --would like to pursue surgical repair; not sexually active + h/o multiple bowel infections/recurrent diverticulosis; discussed increased safety profile of Lefort colpocleisis vs. Intraperitoneal procedure; she is ok with this plan  --pelvic US 12/16: normal but with EMB thickening  --pap 2012: benign; no h/o abnl paps  --EMBx 3/17: benign  --surgical plain:  D&C/hysteroscopy to address EMB thickening and LeFort colpocleisis was scheduled-- cancelled due to hernia surgery     Preoperative Diagnosis:  1. Uterine prolapse      2. Midline cystocele      3. Rectocele      4. Vaginal atrophy      5. Mixed stress and urge urinary incontinence      6.    Urodynamic stress incontinence     Postoperative Diagnosis:  1. Uterine prolapse      2. Midline cystocele      3. Rectocele      4. Vaginal atrophy      5. Mixed stress and urge urinary incontinence      6.    Urodynamic stress incontinence  7.    Vaginal granulation tissue (chronic pessary use)    .  Past Medical History:   Diagnosis Date    Arthritis     Diverticulitis      Hypertension        Past Surgical History:   Procedure Laterality Date    ABLATION COLPOCLESIS N/A 9/10/2018    Procedure: COLPOCLEISIS;  Surgeon: Felicia Gates MD;  Location: [facility] OR [address] [place] [address];  Service: OB/GYN;  Laterality: N/A;    CHOLECYSTECTOMY      COLONOSCOPY N/A 10/5/2015    Procedure: COLONOSCOPY;  Surgeon: Perez White MD;  Location: [facility] (4TH FLR);  Service: Endoscopy;  Laterality: N/A;  C diff negative per Dr White/see microbiology report dated 8/26/15/svn    COLONOSCOPY N/A 10/5/2015    Performed by Perez White MD at [facility] (4TH FLR)    COLPOCLEISIS N/A 9/10/2018    Performed by Felicia Gates MD at [facility] OR [address] [place] [address]    CYSTOSCOPY N/A 9/10/2018    Procedure: CYSTOSCOPY;  Surgeon: Felicia Gates MD;  Location: [facility] OR [address] [place] [address];  Service: OB/GYN;  Laterality: N/A;    CYSTOSCOPY N/A 9/10/2018    Performed by Felicia Gates MD at [facility] OR [address] [place] [address]    ESOPHAGOGASTRODUODENOSCOPY (EGD) N/A 10/5/2015    Performed by Perez White MD at [facility] (4TH FLR)    HERNIA REPAIR      HYSTEROSCOPY WITH DILATION AND CURETTAGE OF UTERUS  9/10/2018    Procedure: HYSTEROSCOPY, WITH DILATION AND CURETTAGE OF UTERUS;  Surgeon: Felicia Gates MD;  Location: [facility] OR [address] [place] [address];  Service: OB/GYN;;    HYSTEROSCOPY, WITH DILATION AND CURETTAGE OF UTERUS  9/10/2018    Performed by Felicia Gates MD at [facility] OR [address] [place] [address]    INSERTION OF MIDURETHRAL SLING N/A 9/10/2018    Procedure: SLING, MIDURETHRAL;  Surgeon: Felicia Gates MD;  Location: [facility] OR [address] [place] [address];  Service: OB/GYN;  Laterality: N/A;    kidney stones removed      SLING, MIDURETHRAL N/A 9/10/2018    Performed by Felicia Gates MD at [facility] OR [address] [place] [address]     History since last visit: scant spotting still. Denies pain or bleeding.  Bladder issues: Denies ERIKA.  Has occasionally UUI.  Nocturia 2/night.  Bowel issues: Denies straining.  Has occasionally lower abdominal cramping.  Still has colon irritability.    Medications:    Current  "Outpatient Medications:     amlodipine (NORVASC) 2.5 MG tablet, Take 2.5 mg by mouth once daily., Disp: , Rfl: 0    aspirin-sod bicarb-citric acid (CHANO-SELTZER) 324 mg TbEF, Take 325 mg by mouth once daily. Patient takes 1/4 tablet for upset stomach, Disp: , Rfl:     atenolol (TENORMIN) 50 MG tablet, Take by mouth. 1 Tablet Oral Every day, Disp: , Rfl:     cholecalciferol, vitamin D3, (VITAMIN D3) 2,000 unit Cap, Take 1 capsule (2,000 Units total) by mouth once daily., Disp: 90 capsule, Rfl: 3    conjugated estrogens (PREMARIN) vaginal cream, Place 0.5 g vaginally twice a week., Disp: 30 g, Rfl: 3    CREON 24,000-76,000 -120,000 unit capsule, , Disp: , Rfl:     dicyclomine (BENTYL) 10 MG capsule, , Disp: , Rfl:     ergocalciferol (ERGOCALCIFEROL) 50,000 unit Cap, Take 1 capsule (50,000 Units total) by mouth every 7 days. for 8 doses, Disp: 8 capsule, Rfl: 0    FLUZONE HIGH-DOSE 2017-18, PF, 180 mcg/0.5 mL vaccine, , Disp: , Rfl:     hydrochlorothiazide (HYDRODIURIL) 25 MG tablet, Take 25 mg by mouth once daily., Disp: , Rfl: 0    ibuprofen (ADVIL,MOTRIN) 600 MG tablet, Take 1 tablet (600 mg total) by mouth every 6 (six) hours., Disp: 30 tablet, Rfl: 1        ROS:  As per HPI.      Exam  /60 (BP Location: Right arm, Patient Position: Sitting, BP Method: Medium (Manual))   Ht 5' 2" (1.575 m)   Wt 61.9 kg (136 lb 7.4 oz)   BMI 24.96 kg/m²   General: alert and oriented, no acute distress  Respiratory: normal respiratory effort  Abd: soft, non-tender, non-distended    Pelvic  Ext. Genitalia: normal external genitalia. Normal bartholin's and skeens glands  Vagina: + atrophy. S/p colpocleisis.  Granulation tissue 1 cm noted just at opening of vagina.  2 cm tracts noted bilaterally.  Still healing. No discharge noted.   Non-tender bladder base without palpable mass.  Urethra: no masses or tenderness  Urethral meatus: no lesions, caruncle or prolapse.    Dr. Gates present during exam    Impression  1. " Post-operative state     2. Vaginal atrophy       We reviewed the above issues and discussed options for short-term versus long-term management of her problems.   Plan:   1. Post op still healing.  2. Use estrogen cream with your finger just inside the vagina  3. Reviewed pathology with patient  4. She will follow up with us in 4 weeks.  30 minutes were spent in face to face time with this patient  90 % of this time was spent in counseling and/or coordination of care    ADDENDUM (Felicia Gates MD):   Reviewed path with patient at 10/23/18 visit.  She understands that endometrial specimen was mislabeled as endocervical, and stains were done for endocervical NOT endometrial specimen.  Understands I reviewed this with both the reading pathologist and Dr. Cuevas.  Both agree that there is no obvious malignancy or significant abnormality. Patient denies h/o abnormal paps in past.  EMBX normal in office and pap normal in office before colpocleisis.  Explained to patient that concern was that something abnormal was occurring intrauterine, after colpocleisis had bee done, and we were trying to assess whether hyst would be indicated, as further testing (orginally recc'd per path due to endocervical stains) could not be done anymore.  Both pathologists did not think risk of abnormality was significant enough to necessarily warrant hyst, lou with improper labeling/staining of specimen and previous benign eval, neg subjective issues.  Patient voiced understanding and is ok with no further intervention.  Remote history of abnormality and minimal risks, thereof, (including very remote possibility of malignancy) discussed with patient.  She accepts these minimal risks.       Madisyn Ramires, NP  [facility]  Division of Female Pelvic Medicine and Reconstructive Surgery  Department of Obstetrics & Gynecology

## 2018-10-23 NOTE — TELEPHONE ENCOUNTER
----- Message from Perez White MD sent at 10/19/2018  4:06 PM CDT -----  Ashley - please order BMP this week to follow up on Linda's low serum potassium    Linda your have immunity to Hepatitis A which is a good thing.

## 2018-10-27 PROBLEM — N81.2 UTEROVAGINAL PROLAPSE, INCOMPLETE: Status: RESOLVED | Noted: 2018-09-10 | Resolved: 2018-10-27

## 2018-10-30 ENCOUNTER — HOSPITAL ENCOUNTER (OUTPATIENT)
Dept: RADIOLOGY | Facility: HOSPITAL | Age: 78
Discharge: HOME | End: 2018-10-30
Attending: INTERNAL MEDICINE
Payer: MEDICARE

## 2018-10-30 DIAGNOSIS — K76.89 LIVER CYST: ICD-10-CM

## 2018-10-30 PROCEDURE — 76705 ECHO EXAM OF ABDOMEN: CPT | Mod: 26,,, | Performed by: RADIOLOGY

## 2018-10-30 PROCEDURE — 76705 ECHO EXAM OF ABDOMEN: CPT | Mod: TC

## 2018-10-31 ENCOUNTER — TELEPHONE (OUTPATIENT)
Dept: GASTROENTEROLOGY | Facility: CLINIC | Age: 78
End: 2018-10-31

## 2018-10-31 NOTE — TELEPHONE ENCOUNTER
----- Message from Arcelia Velasco MA sent at 10/31/2018  1:21 PM CDT -----  Contact: [phone]   Test Results    Type of Test: lab work    Date of Test: 10/10/18  Communication Preference: [phone]  Additional Information: also wants to ask to have a copy sent to her local docotor

## 2018-11-01 ENCOUNTER — TELEPHONE (OUTPATIENT)
Dept: HEPATOLOGY | Facility: CLINIC | Age: 78
End: 2018-11-01

## 2018-11-01 DIAGNOSIS — K76.89 LIVER CYST: Primary | ICD-10-CM

## 2018-11-01 NOTE — TELEPHONE ENCOUNTER
PL inform patient:  Liver cyst has grown a little,  Inside the cyst there are some septations which are likely due to previous blood in the cyst.  Advise is to monitor the cyst.  Please schedule limited ultrasound of abdomen every 6 months x 3, next due in 4/2019..   Thanks

## 2018-11-02 ENCOUNTER — TELEPHONE (OUTPATIENT)
Dept: GASTROENTEROLOGY | Facility: CLINIC | Age: 78
End: 2018-11-02

## 2018-11-02 NOTE — TELEPHONE ENCOUNTER
Pt informed of test results. Pt did not want to schedule blood work. She will talk to her PCP first.

## 2018-11-27 ENCOUNTER — OFFICE VISIT (OUTPATIENT)
Dept: UROGYNECOLOGY | Facility: CLINIC | Age: 78
End: 2018-11-27
Payer: MEDICARE

## 2018-11-27 VITALS
HEIGHT: 62 IN | BODY MASS INDEX: 25.44 KG/M2 | SYSTOLIC BLOOD PRESSURE: 150 MMHG | DIASTOLIC BLOOD PRESSURE: 80 MMHG | WEIGHT: 138.25 LBS

## 2018-11-27 DIAGNOSIS — N95.2 VAGINAL ATROPHY: ICD-10-CM

## 2018-11-27 DIAGNOSIS — Z98.890 POST-OPERATIVE STATE: Primary | ICD-10-CM

## 2018-11-27 DIAGNOSIS — L92.9 GRANULATION TISSUE: ICD-10-CM

## 2018-11-27 PROCEDURE — 99999 PR PBB SHADOW E&M-EST. PATIENT-LVL III: CPT | Mod: PBBFAC,,, | Performed by: NURSE PRACTITIONER

## 2018-11-27 PROCEDURE — 99024 POSTOP FOLLOW-UP VISIT: CPT | Mod: S$GLB,,, | Performed by: NURSE PRACTITIONER

## 2018-11-27 NOTE — PROGRESS NOTES
Urogyn follow up  11/27/2018    [facility]  [address]  [facility] [address]    Linda Felton  [id number]  1940      Linda Felton is a 78 y.o.  here for a urogyn follow up post op visit.    09/10/2018    Title of Operation:  1)  Hysteroscopy with dilation and curettage/polypectomy  2)  LeFort colpocleisis with colpectomy  3)  Placement of transobturator tension-free midurethral sling, Obtryx II ([employer]tricious)  4)  Perineorrhaphy with high levator plication     Indications for Surgery:     1)  UI:  (--) ERIKA   (+) UUI    (+) pads:3/day, usually moderate wetness.  Daytime frequency: Q 3 hours.  Nocturia: NO.   (--) dysuria,  (--) hematuria,  (--) frequent UTIs.  (+) complete bladder emptying.      2)  POP:  Present. below introitus.  Symptoms:(--)    (--) vaginal bleeding. (+) vaginal discharge- scant pink discharge (--) sexually active.  (--)  Vaginal dryness.  (+) vaginal estrogen use.      3)  BM:  (--) constipation/straining.  (--) chronic diarrhea. (--) hematochezia.  (--) fecal incontinence.  (--) fecal smearing/urgency.  (--) incomplete evacuation.  Not taking fiber regularly.    --has diverticulosis (with h/o diverticulitis)--trying to watch irritants (cheese, peanut butter)  --symptoms have been better-- denies diarrhea   --Patient had C. Diff and  + H. Pylori infections   --had incarcerated hernia in      4)pessary:  Denies pain or bleeding. Using #4 ring with support.     5)Patient had C. Diff and  + H. Pylori infections - followed by GI  Still has + abdominal pain and loose frequent stools.  Intermittent diarrhea alternating with constipation.           12/2016  The uterus measures 7.1 x 3.3 x 3.8 cm. Uterine parenchyma is homogeneous with evidence of a few uterine fibroids.  One appears partially calcified and measures 0.7 x 0.6 x 0.3 cm.  The largest is in the myometrium of the uterine fundus measuring 1.6 x 1.6 x 1.6 cm.  Additionally there is a new fibroid  anteriorly measuring 0.9 x 0.7 x 0.9 cm. The endometrial stripe is thickened and measures 0.7 cm.    The right ovary is normal in size and measures 1.1 1.0 x 1.2 cm. The left ovary is normal in size and measures 1.1 x 0.4 x 1.2 cm. Follicles are seen in both ovaries. Arterial and venous flow are preserved bilaterally with resistive indices of 0.7 on the right and 0.75 on the left. No free fluid is seen.   Impression      The uterus demonstrates presence of a few fibroids, 2 of which are stable in appearance, and 1 new fibroid.  There is endometrial thickening, underlying pathology cannot be excluded.  Consider further evaluation.  This report has been flagged in the Epic medical record.      03/14/2017  embx  ATROPHIC ENDOMETRIUM.  NO HYPERPLASIA OR MALIGNANCY IDENTIFIED     09/07/2017  Suds  1.  VOIDING PHASE:       a.  Uroflowmetry:  Not performed.       b.  Pressure flow:  · Prolapse reduction: Yes (pessary)0  · Voided volume:   458 mL  · Voiding time:   507 seconds  · Peak flow:  9 mL/s   · Avg flow:  2 mL/s  · Max det pressure:  36  cm H20  · Det pressure at max flow: 29 cm H20  · Void initiated by unable to discern as pves was dislodged during study.    · Urethral relaxation (EMG):  absent.    · PVR (calculated):  0 mL     The overall configuration of this pressure flow study was prolonged with indeterminate mechanism of void.       2.  FILLING PHASE:  · 1st desire: 98 mL  · Normal desire:  214 mL  · Strong desire:  329 mL  · Urgency:  397 mL  · Compliance (calculated)  ~20 mL/cm H20  · EMG activity during filling:  stable  · Detrusor contractions observed: No.      3.  URETHRAL FUNCTION/STORAGE PHASE:     a.  WITH prolapse reduction:  · CLPP (150 mL): Negative  at  130 cm H20  · VLPP (150 mL): Negative  at  76 cm H20   · CLPP (300 mL): Positive  at  150 cm H20  · VLPP (300 mL): Positive  at  138 cm H20   · CLPP (MAX ):    Positive  at  128 cm H20  · VLPP (MAX):     Positive  at  82 cm H20     These findings  are consistent with Positive urodynamic stress incontinence.     Assessment:  UF not completed.  PF prolonged with indeterminate mechanism of void.  Compliance low normal.  Max capacity 458 mL.  DO (--).  ERIKA (+).       Cysto    Findings: Urethroscopy:  Normal with mild decrease in coaptation.  Cystoscopy:  Normal bladder mucosa, bilateral ureteral flow was noted.     Assessment: Essentially normal cystourethroscopy with mild decrease in urethral coaptation     TODAY:  1)  Mixed urinary incontinence, urge > stress/prolapse:    --no increase in UUI/ ERIKA.  Using  #4 ring with support pessary      2)Vaginal atrophy (dryness):    --using esrogen cream and trimosan     3) Loose stool:  --imporved today  --does have intermittent lower abdominal cramping       4) RUQ pain/ liver cyst  --followed by hepatology     5)  Complete uterovaginal prolapse:  --ready to schedule surgery  --continue pessary use  --would like to pursue surgical repair; not sexually active + h/o multiple bowel infections/recurrent diverticulosis; discussed increased safety profile of Lefort colpocleisis vs. Intraperitoneal procedure; she is ok with this plan  --pelvic US 12/16: normal but with EMB thickening  --pap 2012: benign; no h/o abnl paps  --EMBx 3/17: benign  --surgical plain:  D&C/hysteroscopy to address EMB thickening and LeFort colpocleisis was scheduled-- cancelled due to hernia surgery     Preoperative Diagnosis:  1. Uterine prolapse      2. Midline cystocele      3. Rectocele      4. Vaginal atrophy      5. Mixed stress and urge urinary incontinence      6.    Urodynamic stress incontinence     Postoperative Diagnosis:  1. Uterine prolapse      2. Midline cystocele      3. Rectocele      4. Vaginal atrophy      5. Mixed stress and urge urinary incontinence      6.    Urodynamic stress incontinence  7.    Vaginal granulation tissue (chronic pessary use)    .  Past Medical History:   Diagnosis Date    Arthritis     Diverticulitis      Hypertension        Past Surgical History:   Procedure Laterality Date    ABLATION COLPOCLESIS N/A 9/10/2018    Procedure: COLPOCLEISIS;  Surgeon: Felicia Gates MD;  Location: [facility] OR [address] [place] [address];  Service: OB/GYN;  Laterality: N/A;    CHOLECYSTECTOMY      COLONOSCOPY N/A 10/5/2015    Procedure: COLONOSCOPY;  Surgeon: Perez White MD;  Location: [facility] (4TH FLR);  Service: Endoscopy;  Laterality: N/A;  C diff negative per Dr White/see microbiology report dated 8/26/15/svn    COLONOSCOPY N/A 10/5/2015    Performed by Perez White MD at [facility] ENDO (4TH FLR)    COLPOCLEISIS N/A 9/10/2018    Performed by Felicai Gates MD at [facility] OR [address] [place] [address]    CYSTOSCOPY N/A 9/10/2018    Procedure: CYSTOSCOPY;  Surgeon: Felicia Gates MD;  Location: [facility] OR [address] [place] [address];  Service: OB/GYN;  Laterality: N/A;    CYSTOSCOPY N/A 9/10/2018    Performed by Felicia Gates MD at [facility] OR [address] [place] [address]    ESOPHAGOGASTRODUODENOSCOPY (EGD) N/A 10/5/2015    Performed by Perez White MD at [facility] (4TH FLR)    HERNIA REPAIR      HYSTEROSCOPY WITH DILATION AND CURETTAGE OF UTERUS  9/10/2018    Procedure: HYSTEROSCOPY, WITH DILATION AND CURETTAGE OF UTERUS;  Surgeon: Felicia Gates MD;  Location: [facility] OR [address] [place] [address];  Service: OB/GYN;;    HYSTEROSCOPY, WITH DILATION AND CURETTAGE OF UTERUS  9/10/2018    Performed by Felicia Gates MD at [facility] OR [address] [place] [address]    INSERTION OF MIDURETHRAL SLING N/A 9/10/2018    Procedure: SLING, MIDURETHRAL;  Surgeon: Felicia Gates MD;  Location: [facility] OR [address] [place] [address];  Service: OB/GYN;  Laterality: N/A;    kidney stones removed      SLING, MIDURETHRAL N/A 9/10/2018    Performed by Felicia Gates MD at [facility] OR [address] [place] [address]     History since last visit: Denies pain or bleeding.  Bladder issues: Denies ERIKA.  Has occasionally UUI (rare).  Nocturia 2/night.  Bowel issues: Denies straining.  Has occasionally lower abdominal cramping.  Still has colon irritability.    Medications:    Current Outpatient  "Medications:     amlodipine (NORVASC) 2.5 MG tablet, Take 2.5 mg by mouth once daily., Disp: , Rfl: 0    aspirin-sod bicarb-citric acid (CHANO-SELTZER) 324 mg TbEF, Take 325 mg by mouth once daily. Patient takes 1/4 tablet for upset stomach, Disp: , Rfl:     atenolol (TENORMIN) 50 MG tablet, Take by mouth. 1 Tablet Oral Every day, Disp: , Rfl:     cholecalciferol, vitamin D3, (VITAMIN D3) 2,000 unit Cap, Take 1 capsule (2,000 Units total) by mouth once daily., Disp: 90 capsule, Rfl: 3    conjugated estrogens (PREMARIN) vaginal cream, Place 0.5 g vaginally twice a week., Disp: 30 g, Rfl: 3    dicyclomine (BENTYL) 10 MG capsule, , Disp: , Rfl:     ergocalciferol (ERGOCALCIFEROL) 50,000 unit Cap, Take 1 capsule (50,000 Units total) by mouth every 7 days. for 8 doses, Disp: 8 capsule, Rfl: 0    hydrochlorothiazide (HYDRODIURIL) 25 MG tablet, Take 25 mg by mouth once daily., Disp: , Rfl: 0    ibuprofen (ADVIL,MOTRIN) 600 MG tablet, Take 1 tablet (600 mg total) by mouth every 6 (six) hours., Disp: 30 tablet, Rfl: 1    FLUZONE HIGH-DOSE 2017-18, PF, 180 mcg/0.5 mL vaccine, , Disp: , Rfl:         ROS:  As per HPI.      Exam  BP (!) 150/80   Ht 5' 2" (1.575 m)   Wt 62.7 kg (138 lb 3.7 oz)   BMI 25.28 kg/m²   General: alert and oriented, no acute distress  Respiratory: normal respiratory effort  Abd: soft, non-tender, non-distended    Pelvic  Ext. Genitalia: normal external genitalia. Normal bartholin's and skeens glands  Vagina: + atrophy. S/p colpocleisis.  Granulation tissue 0.5 cm noted just at opening of vagina.  2 cm tracts noted bilaterally.  No discharge noted.   Non-tender bladder base without palpable mass.  Urethra: no masses or tenderness  Urethral meatus: no lesions, caruncle or prolapse.        Impression  1. Post-operative state     2. Vaginal atrophy     3. Granulation tissue       We reviewed the above issues and discussed options for short-term versus long-term management of her problems.   Plan: "   1. No lifting > 50 pounds without assistance.  2. Use estrogen cream with your finger just inside the vagina nightly  3. She will follow up with us in 6 weeks.  30 minutes were spent in face to face time with this patient  90 % of this time was spent in counseling and/or coordination of care    Madisyn Ramires NP  [facility]  Division of Female Pelvic Medicine and Reconstructive Surgery  Department of Obstetrics & Gynecology

## 2018-11-27 NOTE — PATIENT INSTRUCTIONS
1. No lifting > 50 pounds without assistance.  2. Use estrogen cream with your finger just inside the vagina nightly  3. She will follow up with us in 6 weeks.

## 2018-12-04 DIAGNOSIS — E55.9 VITAMIN D INSUFFICIENCY: ICD-10-CM

## 2018-12-04 DIAGNOSIS — E55.9 VITAMIN D INSUFFICIENCY: Primary | ICD-10-CM

## 2018-12-04 RX ORDER — ERGOCALCIFEROL 1.25 MG/1
50000 CAPSULE ORAL
Qty: 8 CAPSULE | Refills: 0 | Status: SHIPPED | OUTPATIENT
Start: 2018-12-04 | End: 2019-01-08

## 2018-12-04 RX ORDER — ERGOCALCIFEROL 1.25 MG/1
CAPSULE ORAL
Qty: 8 CAPSULE | Refills: 0 | Status: SHIPPED | OUTPATIENT
Start: 2018-12-04 | End: 2019-01-08

## 2018-12-04 NOTE — TELEPHONE ENCOUNTER
----- Message from Arcelia Velasco MA sent at 12/4/2018 11:07 AM CST -----  Contact: [phone]  Needs Advice    Reason for call: pt received a call from her pharmacy that she has a prescription ready of vitamin D 50,000 units , she said she already took this a directed (8 pills) and now she is supposed to go down to a 2,000 unit vitamin D        Communication Preference: [phone]    Additional Information: Please clarify

## 2019-01-08 ENCOUNTER — OFFICE VISIT (OUTPATIENT)
Dept: UROGYNECOLOGY | Facility: CLINIC | Age: 79
End: 2019-01-08
Payer: MEDICARE

## 2019-01-08 VITALS
WEIGHT: 138.44 LBS | DIASTOLIC BLOOD PRESSURE: 80 MMHG | BODY MASS INDEX: 25.48 KG/M2 | SYSTOLIC BLOOD PRESSURE: 142 MMHG | HEIGHT: 62 IN

## 2019-01-08 DIAGNOSIS — Z98.890 POST-OPERATIVE STATE: Primary | ICD-10-CM

## 2019-01-08 DIAGNOSIS — N95.2 VAGINAL ATROPHY: ICD-10-CM

## 2019-01-08 PROCEDURE — 99999 PR PBB SHADOW E&M-EST. PATIENT-LVL III: CPT | Mod: PBBFAC,,, | Performed by: NURSE PRACTITIONER

## 2019-01-08 PROCEDURE — 99999 PR PBB SHADOW E&M-EST. PATIENT-LVL III: ICD-10-PCS | Mod: PBBFAC,,, | Performed by: NURSE PRACTITIONER

## 2019-01-08 PROCEDURE — 99024 POSTOP FOLLOW-UP VISIT: CPT | Mod: S$GLB,,, | Performed by: NURSE PRACTITIONER

## 2019-01-08 PROCEDURE — 99024 PR POST-OP FOLLOW-UP VISIT: ICD-10-PCS | Mod: S$GLB,,, | Performed by: NURSE PRACTITIONER

## 2019-01-08 NOTE — PROGRESS NOTES
Urogyn follow up  01/15/2019    [facility]  [address]  [facility] [address]    Linda Felton  [id number]  1940      Linda Felton is a 78 y.o.  here for a urogyn follow up post op visit.    09/10/2018    Title of Operation:  1)  Hysteroscopy with dilation and curettage/polypectomy  2)  LeFort colpocleisis with colpectomy  3)  Placement of transobturator tension-free midurethral sling, Obtryx II ([employer])  4)  Perineorrhaphy with high levator plication     Indications for Surgery:     1)  UI:  (--) ERIKA   (+) UUI    (+) pads:3/day, usually moderate wetness.  Daytime frequency: Q 3 hours.  Nocturia: NO.   (--) dysuria,  (--) hematuria,  (--) frequent UTIs.  (+) complete bladder emptying.      2)  POP:  Present. below introitus.  Symptoms:(--)    (--) vaginal bleeding. (+) vaginal discharge- scant pink discharge (--) sexually active.  (--)  Vaginal dryness.  (+) vaginal estrogen use.      3)  BM:  (--) constipation/straining.  (--) chronic diarrhea. (--) hematochezia.  (--) fecal incontinence.  (--) fecal smearing/urgency.  (--) incomplete evacuation.  Not taking fiber regularly.    --has diverticulosis (with h/o diverticulitis)--trying to watch irritants (cheese, peanut butter)  --symptoms have been better-- denies diarrhea   --Patient had C. Diff and  + H. Pylori infections   --had incarcerated hernia in      4)pessary:  Denies pain or bleeding. Using #4 ring with support.     5)Patient had C. Diff and  + H. Pylori infections - followed by GI  Still has + abdominal pain and loose frequent stools.  Intermittent diarrhea alternating with constipation.           12/2016  The uterus measures 7.1 x 3.3 x 3.8 cm. Uterine parenchyma is homogeneous with evidence of a few uterine fibroids.  One appears partially calcified and measures 0.7 x 0.6 x 0.3 cm.  The largest is in the myometrium of the uterine fundus measuring 1.6 x 1.6 x 1.6 cm.  Additionally there is a new fibroid  anteriorly measuring 0.9 x 0.7 x 0.9 cm. The endometrial stripe is thickened and measures 0.7 cm.    The right ovary is normal in size and measures 1.1 1.0 x 1.2 cm. The left ovary is normal in size and measures 1.1 x 0.4 x 1.2 cm. Follicles are seen in both ovaries. Arterial and venous flow are preserved bilaterally with resistive indices of 0.7 on the right and 0.75 on the left. No free fluid is seen.   Impression      The uterus demonstrates presence of a few fibroids, 2 of which are stable in appearance, and 1 new fibroid.  There is endometrial thickening, underlying pathology cannot be excluded.  Consider further evaluation.  This report has been flagged in the Epic medical record.      03/14/2017  embx  ATROPHIC ENDOMETRIUM.  NO HYPERPLASIA OR MALIGNANCY IDENTIFIED     09/07/2017  Suds  1.  VOIDING PHASE:       a.  Uroflowmetry:  Not performed.       b.  Pressure flow:  · Prolapse reduction: Yes (pessary)0  · Voided volume:   458 mL  · Voiding time:   507 seconds  · Peak flow:  9 mL/s   · Avg flow:  2 mL/s  · Max det pressure:  36  cm H20  · Det pressure at max flow: 29 cm H20  · Void initiated by unable to discern as pves was dislodged during study.    · Urethral relaxation (EMG):  absent.    · PVR (calculated):  0 mL     The overall configuration of this pressure flow study was prolonged with indeterminate mechanism of void.       2.  FILLING PHASE:  · 1st desire: 98 mL  · Normal desire:  214 mL  · Strong desire:  329 mL  · Urgency:  397 mL  · Compliance (calculated)  ~20 mL/cm H20  · EMG activity during filling:  stable  · Detrusor contractions observed: No.      3.  URETHRAL FUNCTION/STORAGE PHASE:     a.  WITH prolapse reduction:  · CLPP (150 mL): Negative  at  130 cm H20  · VLPP (150 mL): Negative  at  76 cm H20   · CLPP (300 mL): Positive  at  150 cm H20  · VLPP (300 mL): Positive  at  138 cm H20   · CLPP (MAX ):    Positive  at  128 cm H20  · VLPP (MAX):     Positive  at  82 cm H20     These findings  are consistent with Positive urodynamic stress incontinence.     Assessment:  UF not completed.  PF prolonged with indeterminate mechanism of void.  Compliance low normal.  Max capacity 458 mL.  DO (--).  ERIKA (+).       Cysto    Findings: Urethroscopy:  Normal with mild decrease in coaptation.  Cystoscopy:  Normal bladder mucosa, bilateral ureteral flow was noted.     Assessment: Essentially normal cystourethroscopy with mild decrease in urethral coaptation     TODAY:  1)  Mixed urinary incontinence, urge > stress/prolapse:    --no increase in UUI/ ERIKA.  Using  #4 ring with support pessary      2)Vaginal atrophy (dryness):    --using esrogen cream and trimosan     3) Loose stool:  --imporved today  --does have intermittent lower abdominal cramping       4) RUQ pain/ liver cyst  --followed by hepatology     5)  Complete uterovaginal prolapse:  --ready to schedule surgery  --continue pessary use  --would like to pursue surgical repair; not sexually active + h/o multiple bowel infections/recurrent diverticulosis; discussed increased safety profile of Lefort colpocleisis vs. Intraperitoneal procedure; she is ok with this plan  --pelvic US 12/16: normal but with EMB thickening  --pap 2012: benign; no h/o abnl paps  --EMBx 3/17: benign  --surgical plain:  D&C/hysteroscopy to address EMB thickening and LeFort colpocleisis was scheduled-- cancelled due to hernia surgery     Preoperative Diagnosis:  1. Uterine prolapse      2. Midline cystocele      3. Rectocele      4. Vaginal atrophy      5. Mixed stress and urge urinary incontinence      6.    Urodynamic stress incontinence     Postoperative Diagnosis:  1. Uterine prolapse      2. Midline cystocele      3. Rectocele      4. Vaginal atrophy      5. Mixed stress and urge urinary incontinence      6.    Urodynamic stress incontinence  7.    Vaginal granulation tissue (chronic pessary use)    .  Past Medical History:   Diagnosis Date    Arthritis     Diverticulitis      "Hypertension        Past Surgical History:   Procedure Laterality Date    CHOLECYSTECTOMY      COLONOSCOPY N/A 10/5/2015    [facility]    COLPOCLEISIS N/A 9/10/2018    [facility]    CYSTOSCOPY N/A 9/10/2018    [facility]    ESOPHAGOGASTRODUODENOSCOPY (EGD) N/A 10/5/2015    [facility] (4TH FLR)    HERNIA REPAIR      HYSTEROSCOPY, WITH DILATION AND CURETTAGE OF UTERUS  9/10/2018    [facility]    kidney stones removed      SLING, MIDURETHRAL N/A 9/10/2018    [facility]     History since last visit: Denies pain or bleeding.  Bladder issues: Denies ERIKA.  Has occasionally UUI (rare).  Nocturia 2/night.  Bowel issues: Denies straining.  Has occasionally lower abdominal cramping.  Still has colon irritability.    Medications:    Current Outpatient Medications:     amlodipine (NORVASC) 2.5 MG tablet, Take 2.5 mg by mouth once daily., Disp: , Rfl: 0    aspirin-sod bicarb-citric acid (CHANO-SELTZER) 324 mg TbEF, Take 325 mg by mouth once daily. Patient takes 1/4 tablet for upset stomach, Disp: , Rfl:     atenolol (TENORMIN) 50 MG tablet, Take by mouth. 1 Tablet Oral Every day, Disp: , Rfl:     conjugated estrogens (PREMARIN) vaginal cream, Place 0.5 g vaginally twice a week., Disp: 30 g, Rfl: 3    hydrochlorothiazide (HYDRODIURIL) 25 MG tablet, Take 25 mg by mouth once daily., Disp: , Rfl: 0        ROS:  As per HPI.      Exam  BP (!) 142/80 (BP Location: Right arm, Patient Position: Sitting, BP Method: Medium (Manual))   Ht 5' 2" (1.575 m)   Wt 62.8 kg (138 lb 7.2 oz)   BMI 25.32 kg/m²   General: alert and oriented, no acute distress  Respiratory: normal respiratory effort  Abd: soft, non-tender, non-distended    Pelvic  Ext. Genitalia: normal external genitalia. Normal " bartholin's and skeens glands  Vagina: + atrophy. S/p colpocleisis.  No granulation tissue noted.  2 cm tracts noted bilaterally.  No discharge noted.   Non-tender bladder base without palpable mass.  Urethra: no masses or tenderness  Urethral meatus: no lesions, caruncle or prolapse.        Impression  1. Post-operative state     2. Vaginal atrophy       We reviewed the above issues and discussed options for short-term versus long-term management of her problems.   Plan:   1. No lifting > 50 pounds without assistance.  2. Use estrogen cream with your finger just inside the vagina twice weekly  3. She will follow up with us in 4 months    30 minutes were spent in face to face time with this patient  90 % of this time was spent in counseling and/or coordination of care    Madisyn Ramires NP  [facility]  Division of Female Pelvic Medicine and Reconstructive Surgery  Department of Obstetrics & Gynecology

## 2019-01-08 NOTE — PATIENT INSTRUCTIONS
1. No lifting > 50 pounds without assistance.  2. Use estrogen cream with your finger just inside the vagina twice weekly  3. She will follow up with us in 4 months

## 2019-02-12 ENCOUNTER — LAB VISIT (OUTPATIENT)
Dept: LAB | Facility: HOSPITAL | Age: 79
End: 2019-02-12
Attending: INTERNAL MEDICINE
Payer: MEDICARE

## 2019-02-12 DIAGNOSIS — E55.9 VITAMIN D INSUFFICIENCY: ICD-10-CM

## 2019-02-12 LAB — 25(OH)D3+25(OH)D2 SERPL-MCNC: 37 NG/ML

## 2019-02-12 PROCEDURE — 36415 COLL VENOUS BLD VENIPUNCTURE: CPT

## 2019-02-12 PROCEDURE — 82306 VITAMIN D 25 HYDROXY: CPT

## 2019-06-24 ENCOUNTER — TELEPHONE (OUTPATIENT)
Dept: GASTROENTEROLOGY | Facility: CLINIC | Age: 79
End: 2019-06-24

## 2019-06-24 NOTE — TELEPHONE ENCOUNTER
----- Message from Joselyn Villafuerte sent at 6/21/2019 11:51 AM CDT -----  Contact: pt: [phone]  Needs Advice    Reason for call: pt would like to schedule appt to see Dr. White for intestinal pain        Communication Preference: pt: [phone]

## 2020-08-18 NOTE — TELEPHONE ENCOUNTER
----- Message from Perez White MD sent at 10/10/2018 10:30 AM CDT -----  VERY IMPORTANT:    Ashley - please tell Linda that her potassium level is rather low and she needs follow up of this this week with her primary care MD or her primary's partner and could be related to her High blood pressure medicine that is a diuretic.    Ashley - Please tell patient that their Vitamin D level is low and recommend that they take Vitamin D 50,000 units once a week for 8 weeks and then start Vitamin D3 (2,000 units) over-the-counter once daily.     Ashley - please recheck their vitamin D level in 4 months - Orders placed.   Strong peripheral pulses

## 2020-10-06 ENCOUNTER — OFFICE VISIT (OUTPATIENT)
Dept: URGENT CARE | Facility: CLINIC | Age: 80
End: 2020-10-06
Payer: MEDICARE

## 2020-10-06 VITALS
HEIGHT: 62 IN | BODY MASS INDEX: 25.4 KG/M2 | SYSTOLIC BLOOD PRESSURE: 136 MMHG | OXYGEN SATURATION: 97 % | TEMPERATURE: 98 F | DIASTOLIC BLOOD PRESSURE: 70 MMHG | WEIGHT: 138 LBS | HEART RATE: 74 BPM | RESPIRATION RATE: 17 BRPM

## 2020-10-06 DIAGNOSIS — B96.89 BACTERIAL SINUSITIS: Primary | ICD-10-CM

## 2020-10-06 DIAGNOSIS — R05.9 COUGH: ICD-10-CM

## 2020-10-06 DIAGNOSIS — J32.9 BACTERIAL SINUSITIS: Primary | ICD-10-CM

## 2020-10-06 LAB
CTP QC/QA: YES
SARS-COV-2 RDRP RESP QL NAA+PROBE: NEGATIVE

## 2020-10-06 PROCEDURE — 99214 OFFICE O/P EST MOD 30 MIN: CPT | Mod: S$GLB,,, | Performed by: PHYSICIAN ASSISTANT

## 2020-10-06 PROCEDURE — 99214 PR OFFICE/OUTPT VISIT, EST, LEVL IV, 30-39 MIN: ICD-10-PCS | Mod: S$GLB,,, | Performed by: PHYSICIAN ASSISTANT

## 2020-10-06 PROCEDURE — U0002: ICD-10-PCS | Mod: QW,S$GLB,, | Performed by: PHYSICIAN ASSISTANT

## 2020-10-06 PROCEDURE — U0002 COVID-19 LAB TEST NON-CDC: HCPCS | Mod: QW,S$GLB,, | Performed by: PHYSICIAN ASSISTANT

## 2020-10-06 RX ORDER — AZITHROMYCIN 250 MG/1
TABLET, FILM COATED ORAL
Qty: 6 TABLET | Refills: 0 | Status: SHIPPED | OUTPATIENT
Start: 2020-10-06 | End: 2020-10-11

## 2020-10-06 RX ORDER — PANTOPRAZOLE SODIUM 40 MG/1
TABLET, DELAYED RELEASE ORAL
COMMUNITY
Start: 2020-09-23

## 2020-10-06 RX ORDER — AMLODIPINE BESYLATE 5 MG/1
5 TABLET ORAL DAILY
COMMUNITY
Start: 2020-07-17

## 2020-10-06 NOTE — PATIENT INSTRUCTIONS
Antibiotics  Fluids  Rest  Simple diet  Go to the emergency room for worsening  Follow up primary care 1 week      Please arrange follow up with your primary medical clinic as soon as possible. You must understand that you've received an Urgent Care treatment only and that you may be released before all of your medical problems are known or treated. You, the patient, will arrange for follow up as instructed. If your symptoms worsen or fail to improve you should go to the Emergency Room.  WE CANNOT RULE OUT ALL POSSIBLE CAUSES OF YOUR SYMPTOMS IN THE URGENT CARE SETTING PLEASE GO TO THE ER IF YOU FEELS YOUR CONDITION IS WORSENING OR YOU WOULD LIKE EMERGENT EVALUATION.      Please drink plenty of fluids.  Please get plenty of rest.  Please return here or go to the Emergency Department for any concerns or worsening of condition.  If you were given wait & see antibiotics, please wait 3-5 days before taking them, and only take them if your symptoms have worsened or not improved.  If you do begin taking the antibiotics, please take them to completion.  If you were prescribed antibiotics, please take them to completion.  If you were prescribed a narcotic medication, do not drive or operate heavy equipment or machinery while taking these medications.  If you do not have Hypertension or any history of palpitations, it is ok to take over the counter Sudafed or Mucinex D or Allegra-D or Claritin-D or Zyrtec-D.  If you do take one of the above, it is ok to combine that with plain over the counter Mucinex or Allegra or Claritin or Zyrtec.  If for example you are taking Zyrtec -D, you can combine that with Mucinex, but not Mucinex-D.  If you are taking Mucinex-D, you can combine that with plain Allegra or Claritin or Zyrtec.   If you do have Hypertension or palpitations, it is safe to take Coricidin HBP for relief of sinus symptoms.  We recommend you take over the counter Flonase (Fluticasone) or another nasally inhaled steroid  unless you are already taking one.  Nasal irrigation with a saline spray or Netti Pot like device per their directions is also recommended.  If not allergic, please take over the counter Tylenol (Acetaminophen) and/or Motrin (Ibuprofen) as directed for control of pain and/or fever.  Please follow up with your primary care doctor or specialist as needed.    If you  smoke, please stop smoking.    Acute Bacterial Rhinosinusitis (ABRS)    Acute bacterial rhinosinusitis (ABRS) is an infection of your nasal cavity and sinuses. Its caused by bacteria. Acute means that youve had symptoms for less than 12 weeks.  Understanding your sinuses  The nasal cavity is the large air-filled space behind your nose. The sinuses are a group of spaces formed by the bones of your face. They connect with your nasal cavity. ABRS causes the tissue lining these spaces to become inflamed. Mucus may not drain normally. This leads to facial pain and other symptoms.  What causes ABRS?  ABRS most often follows an upper respiratory infection caused by a virus. Bacteria then infect the lining of your nasal cavity and sinuses. But you can also get ABRS if you have:  · Nasal allergies  · Long-term nasal swelling and congestion not caused by allergies  · Blockage in the nose  Symptoms of ABRS  The symptoms of ABRS may be different for each person, and can include:  · Nasal congestion  · Runny nose  · Fluid draining from the nose down the throat (postnasal drip)  · Headache  · Cough  · Pain in the sinuses  · Thick, colored fluid from the nose (mucus)  · Fever  Diagnosing ABRS  ABRS may be diagnosed if youve had an upper respiratory infection like a cold and cough for longer than 10 to 14 days. Your health care provider will ask about your symptoms and your medical history. The provider will check your vital signs, including your temperature. Youll have a physical exam. The health care provider will check your ears, nose, and throat. You likely wont  need any tests. If ABRS comes back, you may have a culture or other tests.  Treatment for ABRS  Treatment may include:  · Antibiotic medicine. This is for symptoms that last for at least 10 to 14 days.  · Nasal corticosteroid medicine. Drops or spray used in the nose can lessen swelling and congestion.  · Over-the-counter pain medicine. This is to lessen sinus pain and pressure.  · Nasal decongestant medicine. Spray or drops may help to lessen congestion. Do not use them for more than a few days.  · Salt wash (saline irrigation). This can help to loosen mucus.  Possible complications of ABRS  ABRS may come back or become long-term (chronic).  In rare cases, ABRS may cause complications such as:   · Inflamed tissue around the brain and spinal cord (meningitis)  · Inflamed tissue around the eyes (orbital cellulitis)  · Inflamed bones around the sinuses (osteitis)  These problems may need to be treated in a hospital with intravenous (IV) antibiotic medicine or surgery.  When to call the health care provider  Call your health care provider if you have any of the following:  · Symptoms that dont get better, or get worse  · Symptoms that dont get better after 3 to 5 days on antibiotics  · Trouble seeing  · Swelling around your eyes  · Confusion or trouble staying awake   Date Last Reviewed: 3/3/2015  © 7605-2667 [employer]. [address] [place] [address], [place] [address]. All rights reserved. This information is not intended as a substitute for professional medical care. Always follow your healthcare professional's instructions.

## 2020-10-06 NOTE — PROGRESS NOTES
"Subjective:       Patient ID: Linda Felton is a 80 y.o. female.    Vitals:  height is 5' 2" (1.575 m) and weight is 62.6 kg (138 lb). Her temporal temperature is 97.7 °F (36.5 °C). Her blood pressure is 136/70 and her pulse is 74. Her respiration is 17 and oxygen saturation is 97%.     Chief Complaint: Cough    80 yr old female who presents to the clinic c/o sore throat, sinus congestion, cough over past week. Denies any fevers, chills, SOB, difficulty breathing    URI   This is a new problem. The current episode started in the past 7 days. The problem has been unchanged. There has been no fever. Associated symptoms include congestion, coughing and a sore throat. Pertinent negatives include no ear pain, nausea, rash, sinus pain, vomiting or wheezing. She has tried nothing for the symptoms.       Constitution: Negative for chills, sweating, fatigue and fever.   HENT: Positive for congestion and sore throat. Negative for ear pain, sinus pain, sinus pressure and voice change.    Neck: Negative for painful lymph nodes.   Eyes: Negative for eye redness.   Respiratory: Positive for cough and sputum production. Negative for chest tightness, bloody sputum, COPD, shortness of breath, stridor, wheezing and asthma.    Gastrointestinal: Negative for nausea and vomiting.   Musculoskeletal: Negative for muscle ache.   Skin: Negative for rash.   Allergic/Immunologic: Negative for seasonal allergies and asthma.   Hematologic/Lymphatic: Negative for swollen lymph nodes.       Objective:      Physical Exam   Constitutional: She is oriented to person, place, and time. She appears well-developed. She is cooperative.  Non-toxic appearance. She does not appear ill. No distress.   HENT:   Head: Normocephalic and atraumatic.   Ears:   Right Ear: Hearing, tympanic membrane, external ear and ear canal normal.   Left Ear: Hearing, tympanic membrane, external ear and ear canal normal.   Nose: Mucosal edema present. No rhinorrhea or " nasal deformity. No epistaxis. Right sinus exhibits maxillary sinus tenderness and frontal sinus tenderness. Left sinus exhibits maxillary sinus tenderness and frontal sinus tenderness.   Mouth/Throat: Uvula is midline and mucous membranes are normal. No trismus in the jaw. Normal dentition. No uvula swelling. Posterior oropharyngeal erythema (mild, post nasal drip) and cobblestoning present. No oropharyngeal exudate, posterior oropharyngeal edema or tonsillar abscesses. Tonsils are 1+ on the right. Tonsils are 1+ on the left. No tonsillar exudate.   Eyes: Conjunctivae and lids are normal. No scleral icterus.   Neck: Trachea normal, full passive range of motion without pain and phonation normal. Neck supple. No neck rigidity. No edema and no erythema present.   Cardiovascular: Normal rate, regular rhythm, normal heart sounds and normal pulses.   Pulmonary/Chest: Effort normal and breath sounds normal. No respiratory distress. She has no decreased breath sounds. She has no rhonchi.   Abdominal: Normal appearance.   Musculoskeletal: Normal range of motion.         General: No deformity.   Neurological: She is alert and oriented to person, place, and time. She exhibits normal muscle tone. Coordination normal.   Skin: Skin is warm, dry, intact, not diaphoretic and not pale. Psychiatric: Her speech is normal and behavior is normal. Judgment and thought content normal.   Nursing note and vitals reviewed.    Results for orders placed or performed in visit on 10/06/20   POCT COVID-19 Rapid Screening   Result Value Ref Range    POC Rapid COVID Negative Negative     Acceptable Yes            Assessment:       1. Bacterial sinusitis    2. Cough        Plan:       Pt with likely bacterial sinusitis  She has allergy to penicillins. States she usually has zpak with good relief. Last received in July 2020.   Will tx with zpak and fluids, rest, should go to the ED for any worsening    Bacterial sinusitis  -      azithromycin (Z-DAMASO) 250 MG tablet; Take 2 tablets by mouth on day 1; Take 1 tablet by mouth on days 2-5  Dispense: 6 tablet; Refill: 0    Cough  -     POCT COVID-19 Rapid Screening         Labs reviewed, pertinent imaging reviewed, previous medical records, medical history, surgical history, social history, family history reviewed.  Patient Instructions     Antibiotics  Fluids  Rest  Simple diet  Go to the emergency room for worsening  Follow up primary care 1 week      Please arrange follow up with your primary medical clinic as soon as possible. You must understand that you've received an Urgent Care treatment only and that you may be released before all of your medical problems are known or treated. You, the patient, will arrange for follow up as instructed. If your symptoms worsen or fail to improve you should go to the Emergency Room.  WE CANNOT RULE OUT ALL POSSIBLE CAUSES OF YOUR SYMPTOMS IN THE URGENT CARE SETTING PLEASE GO TO THE ER IF YOU FEELS YOUR CONDITION IS WORSENING OR YOU WOULD LIKE EMERGENT EVALUATION.      Please drink plenty of fluids.  Please get plenty of rest.  Please return here or go to the Emergency Department for any concerns or worsening of condition.  If you were given wait & see antibiotics, please wait 3-5 days before taking them, and only take them if your symptoms have worsened or not improved.  If you do begin taking the antibiotics, please take them to completion.  If you were prescribed antibiotics, please take them to completion.  If you were prescribed a narcotic medication, do not drive or operate heavy equipment or machinery while taking these medications.  If you do not have Hypertension or any history of palpitations, it is ok to take over the counter Sudafed or Mucinex D or Allegra-D or Claritin-D or Zyrtec-D.  If you do take one of the above, it is ok to combine that with plain over the counter Mucinex or Allegra or Claritin or Zyrtec.  If for example you are taking  Zyrtec -D, you can combine that with Mucinex, but not Mucinex-D.  If you are taking Mucinex-D, you can combine that with plain Allegra or Claritin or Zyrtec.   If you do have Hypertension or palpitations, it is safe to take Coricidin HBP for relief of sinus symptoms.  We recommend you take over the counter Flonase (Fluticasone) or another nasally inhaled steroid unless you are already taking one.  Nasal irrigation with a saline spray or Netti Pot like device per their directions is also recommended.  If not allergic, please take over the counter Tylenol (Acetaminophen) and/or Motrin (Ibuprofen) as directed for control of pain and/or fever.  Please follow up with your primary care doctor or specialist as needed.    If you  smoke, please stop smoking.    Acute Bacterial Rhinosinusitis (ABRS)    Acute bacterial rhinosinusitis (ABRS) is an infection of your nasal cavity and sinuses. Its caused by bacteria. Acute means that youve had symptoms for less than 12 weeks.  Understanding your sinuses  The nasal cavity is the large air-filled space behind your nose. The sinuses are a group of spaces formed by the bones of your face. They connect with your nasal cavity. ABRS causes the tissue lining these spaces to become inflamed. Mucus may not drain normally. This leads to facial pain and other symptoms.  What causes ABRS?  ABRS most often follows an upper respiratory infection caused by a virus. Bacteria then infect the lining of your nasal cavity and sinuses. But you can also get ABRS if you have:  · Nasal allergies  · Long-term nasal swelling and congestion not caused by allergies  · Blockage in the nose  Symptoms of ABRS  The symptoms of ABRS may be different for each person, and can include:  · Nasal congestion  · Runny nose  · Fluid draining from the nose down the throat (postnasal drip)  · Headache  · Cough  · Pain in the sinuses  · Thick, colored fluid from the nose (mucus)  · Fever  Diagnosing ABRS  ABRS may be  diagnosed if youve had an upper respiratory infection like a cold and cough for longer than 10 to 14 days. Your health care provider will ask about your symptoms and your medical history. The provider will check your vital signs, including your temperature. Youll have a physical exam. The health care provider will check your ears, nose, and throat. You likely wont need any tests. If ABRS comes back, you may have a culture or other tests.  Treatment for ABRS  Treatment may include:  · Antibiotic medicine. This is for symptoms that last for at least 10 to 14 days.  · Nasal corticosteroid medicine. Drops or spray used in the nose can lessen swelling and congestion.  · Over-the-counter pain medicine. This is to lessen sinus pain and pressure.  · Nasal decongestant medicine. Spray or drops may help to lessen congestion. Do not use them for more than a few days.  · Salt wash (saline irrigation). This can help to loosen mucus.  Possible complications of ABRS  ABRS may come back or become long-term (chronic).  In rare cases, ABRS may cause complications such as:   · Inflamed tissue around the brain and spinal cord (meningitis)  · Inflamed tissue around the eyes (orbital cellulitis)  · Inflamed bones around the sinuses (osteitis)  These problems may need to be treated in a hospital with intravenous (IV) antibiotic medicine or surgery.  When to call the health care provider  Call your health care provider if you have any of the following:  · Symptoms that dont get better, or get worse  · Symptoms that dont get better after 3 to 5 days on antibiotics  · Trouble seeing  · Swelling around your eyes  · Confusion or trouble staying awake   Date Last Reviewed: 3/3/2015  © 6983-1288 [employer]. [address] [place] [address], [place] [address]. All rights reserved. This information is not intended as a substitute for professional medical care. Always follow your healthcare professional's instructions.

## 2021-01-10 ENCOUNTER — IMMUNIZATION (OUTPATIENT)
Dept: INTERNAL MEDICINE | Facility: CLINIC | Age: 81
End: 2021-01-10
Payer: MEDICARE

## 2021-01-10 DIAGNOSIS — Z23 NEED FOR VACCINATION: ICD-10-CM

## 2021-01-10 PROCEDURE — 91300 COVID-19, MRNA, LNP-S, PF, 30 MCG/0.3 ML DOSE VACCINE: CPT | Mod: PBBFAC | Performed by: FAMILY MEDICINE

## 2021-01-31 ENCOUNTER — IMMUNIZATION (OUTPATIENT)
Dept: INTERNAL MEDICINE | Facility: CLINIC | Age: 81
End: 2021-01-31
Payer: MEDICARE

## 2021-01-31 DIAGNOSIS — Z23 NEED FOR VACCINATION: Primary | ICD-10-CM

## 2021-01-31 PROCEDURE — 91300 COVID-19, MRNA, LNP-S, PF, 30 MCG/0.3 ML DOSE VACCINE: CPT | Mod: PBBFAC | Performed by: FAMILY MEDICINE

## 2021-01-31 PROCEDURE — 0002A COVID-19, MRNA, LNP-S, PF, 30 MCG/0.3 ML DOSE VACCINE: CPT | Mod: PBBFAC | Performed by: FAMILY MEDICINE

## 2021-10-18 ENCOUNTER — IMMUNIZATION (OUTPATIENT)
Dept: INTERNAL MEDICINE | Facility: CLINIC | Age: 81
End: 2021-10-18
Payer: MEDICARE

## 2021-10-18 DIAGNOSIS — Z23 NEED FOR VACCINATION: Primary | ICD-10-CM

## 2021-10-18 PROCEDURE — 91300 COVID-19, MRNA, LNP-S, PF, 30 MCG/0.3 ML DOSE VACCINE: CPT | Mod: PBBFAC | Performed by: INTERNAL MEDICINE

## 2021-10-18 PROCEDURE — 0003A COVID-19, MRNA, LNP-S, PF, 30 MCG/0.3 ML DOSE VACCINE: CPT | Mod: CV19,PBBFAC | Performed by: INTERNAL MEDICINE

## 2022-05-19 ENCOUNTER — IMMUNIZATION (OUTPATIENT)
Dept: INTERNAL MEDICINE | Facility: CLINIC | Age: 82
End: 2022-05-19
Payer: MEDICARE

## 2022-05-19 DIAGNOSIS — Z23 NEED FOR VACCINATION: Primary | ICD-10-CM

## 2022-05-19 PROCEDURE — 91305 COVID-19, MRNA, LNP-S, PF, 30 MCG/0.3 ML DOSE VACCINE (PFIZER): CPT | Mod: PBBFAC | Performed by: INTERNAL MEDICINE

## 2022-11-08 ENCOUNTER — IMMUNIZATION (OUTPATIENT)
Dept: INTERNAL MEDICINE | Facility: CLINIC | Age: 82
End: 2022-11-08
Payer: MEDICARE

## 2022-11-08 DIAGNOSIS — Z23 NEED FOR VACCINATION: Primary | ICD-10-CM

## 2022-11-08 PROCEDURE — 91312 COVID-19, MRNA, LNP-S, BIVALENT BOOSTER, PF, 30 MCG/0.3 ML DOSE: CPT | Mod: S$GLB,,, | Performed by: INTERNAL MEDICINE

## 2022-11-08 PROCEDURE — 0124A COVID-19, MRNA, LNP-S, BIVALENT BOOSTER, PF, 30 MCG/0.3 ML DOSE: CPT | Mod: CV19,PBBFAC | Performed by: INTERNAL MEDICINE

## 2022-11-08 PROCEDURE — 91312 COVID-19, MRNA, LNP-S, BIVALENT BOOSTER, PF, 30 MCG/0.3 ML DOSE: ICD-10-PCS | Mod: S$GLB,,, | Performed by: INTERNAL MEDICINE

## 2022-12-28 ENCOUNTER — OFFICE VISIT (OUTPATIENT)
Dept: URGENT CARE | Facility: CLINIC | Age: 82
End: 2022-12-28
Payer: MEDICARE

## 2022-12-28 VITALS
WEIGHT: 132 LBS | HEART RATE: 82 BPM | DIASTOLIC BLOOD PRESSURE: 90 MMHG | BODY MASS INDEX: 24.14 KG/M2 | SYSTOLIC BLOOD PRESSURE: 163 MMHG | TEMPERATURE: 98 F | OXYGEN SATURATION: 97 % | RESPIRATION RATE: 16 BRPM

## 2022-12-28 DIAGNOSIS — J06.9 UPPER RESPIRATORY TRACT INFECTION, UNSPECIFIED TYPE: Primary | ICD-10-CM

## 2022-12-28 DIAGNOSIS — R03.0 ELEVATED BLOOD PRESSURE READING: ICD-10-CM

## 2022-12-28 LAB
CTP QC/QA: YES
SARS-COV-2 AG RESP QL IA.RAPID: NEGATIVE

## 2022-12-28 PROCEDURE — U0002 SARS CORONAVIRUS 2 ANTIGEN POCT, MANUAL READ: ICD-10-PCS | Mod: QW,S$GLB,, | Performed by: FAMILY MEDICINE

## 2022-12-28 PROCEDURE — 1159F PR MEDICATION LIST DOCUMENTED IN MEDICAL RECORD: ICD-10-PCS | Mod: CPTII,S$GLB,, | Performed by: FAMILY MEDICINE

## 2022-12-28 PROCEDURE — 3077F SYST BP >= 140 MM HG: CPT | Mod: CPTII,S$GLB,, | Performed by: FAMILY MEDICINE

## 2022-12-28 PROCEDURE — 3080F DIAST BP >= 90 MM HG: CPT | Mod: CPTII,S$GLB,, | Performed by: FAMILY MEDICINE

## 2022-12-28 PROCEDURE — 3077F PR MOST RECENT SYSTOLIC BLOOD PRESSURE >= 140 MM HG: ICD-10-PCS | Mod: CPTII,S$GLB,, | Performed by: FAMILY MEDICINE

## 2022-12-28 PROCEDURE — 1160F PR REVIEW ALL MEDS BY PRESCRIBER/CLIN PHARMACIST DOCUMENTED: ICD-10-PCS | Mod: CPTII,S$GLB,, | Performed by: FAMILY MEDICINE

## 2022-12-28 PROCEDURE — 1160F RVW MEDS BY RX/DR IN RCRD: CPT | Mod: CPTII,S$GLB,, | Performed by: FAMILY MEDICINE

## 2022-12-28 PROCEDURE — U0002 COVID-19 LAB TEST NON-CDC: HCPCS | Mod: QW,S$GLB,, | Performed by: FAMILY MEDICINE

## 2022-12-28 PROCEDURE — 1125F AMNT PAIN NOTED PAIN PRSNT: CPT | Mod: CPTII,S$GLB,, | Performed by: FAMILY MEDICINE

## 2022-12-28 PROCEDURE — 1125F PR PAIN SEVERITY QUANTIFIED, PAIN PRESENT: ICD-10-PCS | Mod: CPTII,S$GLB,, | Performed by: FAMILY MEDICINE

## 2022-12-28 PROCEDURE — 1159F MED LIST DOCD IN RCRD: CPT | Mod: CPTII,S$GLB,, | Performed by: FAMILY MEDICINE

## 2022-12-28 PROCEDURE — 99214 PR OFFICE/OUTPT VISIT, EST, LEVL IV, 30-39 MIN: ICD-10-PCS | Mod: S$GLB,,, | Performed by: FAMILY MEDICINE

## 2022-12-28 PROCEDURE — 99214 OFFICE O/P EST MOD 30 MIN: CPT | Mod: S$GLB,,, | Performed by: FAMILY MEDICINE

## 2022-12-28 PROCEDURE — 3080F PR MOST RECENT DIASTOLIC BLOOD PRESSURE >= 90 MM HG: ICD-10-PCS | Mod: CPTII,S$GLB,, | Performed by: FAMILY MEDICINE

## 2022-12-28 RX ORDER — METFORMIN HYDROCHLORIDE 500 MG/1
500 TABLET ORAL 2 TIMES DAILY WITH MEALS
COMMUNITY

## 2022-12-28 RX ORDER — BENZONATATE 100 MG/1
100 CAPSULE ORAL EVERY 6 HOURS PRN
Qty: 30 CAPSULE | Refills: 1 | Status: SHIPPED | OUTPATIENT
Start: 2022-12-28 | End: 2023-12-28

## 2022-12-28 RX ORDER — METOPROLOL SUCCINATE 50 MG/1
50 TABLET, EXTENDED RELEASE ORAL DAILY
COMMUNITY

## 2022-12-28 NOTE — PROGRESS NOTES
Subjective:       Patient ID: Linda Felton is a 82 y.o. female.    Vitals:  weight is 59.9 kg (132 lb). Her oral temperature is 98.4 °F (36.9 °C). Her blood pressure is 163/90 (abnormal) and her pulse is 82. Her respiration is 16 and oxygen saturation is 97%.     Chief Complaint: Sore Throat    This is a 82 y.o. female who presents today with a chief complaint of sore throat, cough (worsening at night), nasal congestion, runny nose, sinus H/A x 5 days.     Home tx: salt water gargle    PMH: prone to sinus infections,     Sore Throat   There has been no fever. The pain is at a severity of 10/10. Associated symptoms include coughing, ear pain, headaches and trouble swallowing.     HENT:  Positive for ear pain, sore throat and trouble swallowing.    Respiratory:  Positive for cough.    Neurological:  Positive for headaches.     Objective:      Physical Exam   Constitutional: She does not appear ill. No distress. normal  HENT:   Head: Normocephalic.   Nose: Congestion present. No rhinorrhea.   Mouth/Throat: Mucous membranes are moist. Posterior oropharyngeal erythema present.   Eyes: Pupils are equal, round, and reactive to light. Extraocular movement intact   Neck: Neck supple.   Cardiovascular: Normal rate, regular rhythm, normal heart sounds and normal pulses.   Pulmonary/Chest: Effort normal and breath sounds normal.   Abdominal: Normal appearance. Soft.   Musculoskeletal:      Cervical back: She exhibits no tenderness.   Lymphadenopathy:     She has cervical adenopathy.   Neurological: She is alert.   Nursing note and vitals reviewed.      Results for orders placed or performed in visit on 12/28/22   SARS Coronavirus 2 Antigen, POCT Manual Read   Result Value Ref Range    SARS Coronavirus 2 Antigen Negative Negative     Acceptable Yes       Assessment:       1. Upper respiratory tract infection, unspecified type    2. Elevated blood pressure reading          Plan:         Upper respiratory  tract infection, unspecified type  -     SARS Coronavirus 2 Antigen, POCT Manual Read  -     benzonatate (TESSALON PERLES) 100 MG capsule; Take 1 capsule (100 mg total) by mouth every 6 (six) hours as needed for Cough.  Dispense: 30 capsule; Refill: 1    Elevated blood pressure reading    To see PCP regarding BP management.

## 2024-07-10 ENCOUNTER — OFFICE VISIT (OUTPATIENT)
Dept: UROGYNECOLOGY | Facility: CLINIC | Age: 84
End: 2024-07-10
Payer: MEDICARE

## 2024-07-10 VITALS
HEART RATE: 79 BPM | SYSTOLIC BLOOD PRESSURE: 174 MMHG | HEIGHT: 62 IN | WEIGHT: 127 LBS | DIASTOLIC BLOOD PRESSURE: 88 MMHG | BODY MASS INDEX: 23.37 KG/M2

## 2024-07-10 DIAGNOSIS — N95.2 VAGINAL ATROPHY: ICD-10-CM

## 2024-07-10 DIAGNOSIS — N39.0 FREQUENT UTI: Primary | ICD-10-CM

## 2024-07-10 LAB
MICROSCOPIC COMMENT: NORMAL
RBC #/AREA URNS AUTO: 0 /HPF (ref 0–4)
SQUAMOUS #/AREA URNS AUTO: 1 /HPF
WBC #/AREA URNS AUTO: 2 /HPF (ref 0–5)

## 2024-07-10 PROCEDURE — 81001 URINALYSIS AUTO W/SCOPE: CPT | Performed by: NURSE PRACTITIONER

## 2024-07-10 PROCEDURE — 1160F RVW MEDS BY RX/DR IN RCRD: CPT | Mod: CPTII,S$GLB,, | Performed by: NURSE PRACTITIONER

## 2024-07-10 PROCEDURE — 99999 PR PBB SHADOW E&M-EST. PATIENT-LVL IV: CPT | Mod: PBBFAC,,, | Performed by: NURSE PRACTITIONER

## 2024-07-10 PROCEDURE — 3079F DIAST BP 80-89 MM HG: CPT | Mod: CPTII,S$GLB,, | Performed by: NURSE PRACTITIONER

## 2024-07-10 PROCEDURE — 99203 OFFICE O/P NEW LOW 30 MIN: CPT | Mod: 25,S$GLB,, | Performed by: NURSE PRACTITIONER

## 2024-07-10 PROCEDURE — 87086 URINE CULTURE/COLONY COUNT: CPT | Performed by: NURSE PRACTITIONER

## 2024-07-10 PROCEDURE — 1159F MED LIST DOCD IN RCRD: CPT | Mod: CPTII,S$GLB,, | Performed by: NURSE PRACTITIONER

## 2024-07-10 PROCEDURE — 3077F SYST BP >= 140 MM HG: CPT | Mod: CPTII,S$GLB,, | Performed by: NURSE PRACTITIONER

## 2024-07-10 PROCEDURE — 3288F FALL RISK ASSESSMENT DOCD: CPT | Mod: CPTII,S$GLB,, | Performed by: NURSE PRACTITIONER

## 2024-07-10 PROCEDURE — 1125F AMNT PAIN NOTED PAIN PRSNT: CPT | Mod: CPTII,S$GLB,, | Performed by: NURSE PRACTITIONER

## 2024-07-10 PROCEDURE — 1101F PT FALLS ASSESS-DOCD LE1/YR: CPT | Mod: CPTII,S$GLB,, | Performed by: NURSE PRACTITIONER

## 2024-07-10 RX ORDER — NITROFURANTOIN 25; 75 MG/1; MG/1
100 CAPSULE ORAL 2 TIMES DAILY
COMMUNITY
Start: 2024-07-06

## 2024-07-10 RX ORDER — ESTRADIOL 0.1 MG/G
1 CREAM VAGINAL DAILY
Qty: 42.5 G | Refills: 3 | Status: SHIPPED | OUTPATIENT
Start: 2024-07-10

## 2024-07-10 NOTE — PATIENT INSTRUCTIONS
No lifting > 50 pounds without assistance.  Use estrogen cream with your finger just inside the vagina twice weekly  Urine micro culture today   Start daily fiber.  Take 1 tsp of fiber powder (psyllium or other sugar-free powder).  Mix in 8 oz of water.  Take x 3-5 days.  Then, increase fiber by 1 tsp every 3-5 days until stool is easy to pass.  Stop and continue at that dose.   Do not exceed 6 tsps/day.  May also use over the counter stool softener 1-2 x/day.  AVOID laxatives.  She will follow up with us in 6 months

## 2024-07-10 NOTE — PROGRESS NOTES
Urogyn follow up  07/10/2024    [facility]  [address]  [facility] [id number]    Linda Felton  [id number]  1940      Linda Felton is a 84 y.o.  here for a urogyn follow up for complaints of frequent uti.    09/10/2018    Title of Operation:  1)  Hysteroscopy with dilation and curettage/polypectomy  2)  LeFort colpocleisis with colpectomy  3)  Placement of transobturator tension-free midurethral sling, Obtryx II (MediTAP)  4)  Perineorrhaphy with high levator plication     Indications for Surgery:     1)  UI:  (--) ERIKA   (+) UUI    (+) pads:3/day, usually moderate wetness.  Daytime frequency: Q 3 hours.  Nocturia: NO.   (--) dysuria,  (--) hematuria,  (--) frequent UTIs.  (+) complete bladder emptying.      2)  POP:  Present. below introitus.  Symptoms:(--)    (--) vaginal bleeding. (+) vaginal discharge- scant pink discharge (--) sexually active.  (--)  Vaginal dryness.  (+) vaginal estrogen use.      3)  BM:  (--) constipation/straining.  (--) chronic diarrhea. (--) hematochezia.  (--) fecal incontinence.  (--) fecal smearing/urgency.  (--) incomplete evacuation.  Not taking fiber regularly.    --has diverticulosis (with h/o diverticulitis)--trying to watch irritants (cheese, peanut butter)  --symptoms have been better-- denies diarrhea   --Patient had C. Diff and  + H. Pylori infections   --had incarcerated hernia in      4)pessary:  Denies pain or bleeding. Using #4 ring with support.     5)Patient had C. Diff and  + H. Pylori infections - followed by GI  Still has + abdominal pain and loose frequent stools.  Intermittent diarrhea alternating with constipation.           12/2016  The uterus measures 7.1 x 3.3 x 3.8 cm. Uterine parenchyma is homogeneous with evidence of a few uterine fibroids.  One appears partially calcified and measures 0.7 x 0.6 x 0.3 cm.  The largest is in the myometrium of the uterine fundus measuring 1.6 x 1.6 x 1.6 cm.  Additionally there  is a new fibroid anteriorly measuring 0.9 x 0.7 x 0.9 cm. The endometrial stripe is thickened and measures 0.7 cm.    The right ovary is normal in size and measures 1.1 1.0 x 1.2 cm. The left ovary is normal in size and measures 1.1 x 0.4 x 1.2 cm. Follicles are seen in both ovaries. Arterial and venous flow are preserved bilaterally with resistive indices of 0.7 on the right and 0.75 on the left. No free fluid is seen.   Impression      The uterus demonstrates presence of a few fibroids, 2 of which are stable in appearance, and 1 new fibroid.  There is endometrial thickening, underlying pathology cannot be excluded.  Consider further evaluation.  This report has been flagged in the [facility] medical record.      03/14/2017  embx  ATROPHIC ENDOMETRIUM.  NO HYPERPLASIA OR MALIGNANCY IDENTIFIED     09/07/2017  Suds  1.  VOIDING PHASE:       a.  Uroflowmetry:  Not performed.       b.  Pressure flow:  Prolapse reduction: Yes (pessary)0  Voided volume:   458 mL  Voiding time:   507 seconds  Peak flow:  9 mL/s   Avg flow:  2 mL/s  Max det pressure:  36  cm H20  Det pressure at max flow: 29 cm H20  Void initiated by unable to discern as pves was dislodged during study.    Urethral relaxation (EMG):  absent.    PVR (calculated):  0 mL     The overall configuration of this pressure flow study was prolonged with indeterminate mechanism of void.       2.  FILLING PHASE:  1st desire: 98 mL  Normal desire:  214 mL  Strong desire:  329 mL  Urgency:  397 mL  Compliance (calculated)  ~20 mL/cm H20  EMG activity during filling:  stable  Detrusor contractions observed: No.      3.  URETHRAL FUNCTION/STORAGE PHASE:     a.  WITH prolapse reduction:  CLPP (150 mL): Negative  at  130 cm H20  VLPP (150 mL): Negative  at  76 cm H20   CLPP (300 mL): Positive  at  150 cm H20  VLPP (300 mL): Positive  at  138 cm H20   CLPP (MAX ):    Positive  at  128 cm H20  VLPP (MAX):     Positive  at  82 cm H20     These findings are consistent with Positive  urodynamic stress incontinence.     Assessment:  UF not completed.  PF prolonged with indeterminate mechanism of void.  Compliance low normal.  Max capacity 458 mL.  DO (--).  ERIKA (+).       Cysto    Findings: Urethroscopy:  Normal with mild decrease in coaptation.  Cystoscopy:  Normal bladder mucosa, bilateral ureteral flow was noted.     Assessment: Essentially normal cystourethroscopy with mild decrease in urethral coaptation     08/28/2018  1)  Mixed urinary incontinence, urge > stress/prolapse:    --no increase in UUI/ ERIKA.  Using  #4 ring with support pessary      2)Vaginal atrophy (dryness):    --using esrogen cream and trimosan     3) Loose stool:  --imporved today  --does have intermittent lower abdominal cramping       4) RUQ pain/ liver cyst  --followed by hepatology     5)  Complete uterovaginal prolapse:  --ready to schedule surgery  --continue pessary use  --would like to pursue surgical repair; not sexually active + h/o multiple bowel infections/recurrent diverticulosis; discussed increased safety profile of Lefort colpocleisis vs. Intraperitoneal procedure; she is ok with this plan  --pelvic US 12/16: normal but with EMB thickening  --pap 2012: benign; no h/o abnl paps  --EMBx 3/17: benign  --surgical plain:  D&C/hysteroscopy to address EMB thickening and LeFort colpocleisis was scheduled-- cancelled due to hernia surgery     Preoperative Diagnosis:  1. Uterine prolapse      2. Midline cystocele      3. Rectocele      4. Vaginal atrophy      5. Mixed stress and urge urinary incontinence      6.    Urodynamic stress incontinence     Postoperative Diagnosis:  1. Uterine prolapse      2. Midline cystocele      3. Rectocele      4. Vaginal atrophy      5. Mixed stress and urge urinary incontinence      6.    Urodynamic stress incontinence  7.    Vaginal granulation tissue (chronic pessary use)    .  Past Medical History:   Diagnosis Date    Arthritis     Diverticulitis     Hypertension        Past  Surgical History:   Procedure Laterality Date    ABLATION COLPOCLESIS N/A 9/10/2018    Procedure: COLPOCLEISIS;  Surgeon: Felicia Gates MD;  [facility];  Service: OB/GYN;  Laterality: N/A;    CHOLECYSTECTOMY      COLONOSCOPY N/A 10/5/2015    Procedure: COLONOSCOPY;  Surgeon: Perez White MD;  Location: [facility] ENDO (4TH FLR);  Service: Endoscopy;  Laterality: N/A;  C diff negative per Dr White/see microbiology report dated 8/26/15/svn    CYSTOSCOPY N/A 9/10/2018    Procedure: CYSTOSCOPY;  Surgeon: Felicia Gates MD;  Location: [facility] OR 2ND FLR;  Service: OB/GYN;  Laterality: N/A;    HERNIA REPAIR      HYSTEROSCOPY WITH DILATION AND CURETTAGE OF UTERUS  9/10/2018    Procedure: HYSTEROSCOPY, WITH DILATION AND CURETTAGE OF UTERUS;  Surgeon: Felicia Gates MD;  [facility];  Service: OB/GYN;;    INSERTION OF MIDURETHRAL SLING N/A 9/10/2018    Procedure: SLING, MIDURETHRAL;  Surgeon: Felicia Gates MD;  Location: [facility] OR 2ND FLR;  Service: OB/GYN;  Laterality: N/A;    kidney stones removed       01/18/2019   Denies pain or bleeding.  Bladder issues: Denies ERIKA.  Has occasionally UUI (rare).  Nocturia 2/night.     Bowel issues: Denies straining.  Has occasionally lower abdominal cramping.  Still has colon irritability.     Changes since last visit:  Denies vaginal pain, bleeding, or discharge.   Reports frequent uti.--symptoms are dysuria --did have fever once--treated by Dr. Bhargav Fernandez--in [place].  Treated with macrobid-- currently taking it.   Having irregular bowel movements-- has some urgency with fecal smearing. Not taking fiber supplement.  GI MD has retired    Medications:    Current Outpatient Medications:     amLODIPine (NORVASC) 5 MG tablet, Take 5 mg by mouth once daily., Disp: , Rfl:     aspirin-sod bicarb-citric acid (CHANO-SELTZER) 324 mg TbEF, Take 325 mg by mouth once daily. Patient takes 1/4 tablet for upset stomach, Disp: , Rfl:     atenolol (TENORMIN) 50 MG  "tablet, Take by mouth. 1 Tablet Oral Every day, Disp: , Rfl:     hydrochlorothiazide (HYDRODIURIL) 25 MG tablet, Take 25 mg by mouth once daily., Disp: , Rfl: 0    metFORMIN (GLUCOPHAGE) 500 MG tablet, Take 500 mg by mouth 2 (two) times daily with meals., Disp: , Rfl:     metoprolol succinate (TOPROL-XL) 50 MG 24 hr tablet, Take 50 mg by mouth once daily., Disp: , Rfl:     nitrofurantoin, macrocrystal-monohydrate, (MACROBID) 100 MG capsule, Take 100 mg by mouth 2 (two) times daily., Disp: , Rfl:     pantoprazole (PROTONIX) 40 MG tablet, , Disp: , Rfl:     estradioL (ESTRACE) 0.01 % (0.1 mg/gram) vaginal cream, Place 1 g vaginally once daily., Disp: 42.5 g, Rfl: 3        ROS:  As per HPI.      Exam  BP (!) 174/88 (BP Location: Left arm, Patient Position: Sitting, BP Method: Medium (Automatic))   Pulse 79   Ht 5' 2" (1.575 m)   Wt 57.6 kg (126 lb 15.8 oz)   BMI 23.23 kg/m²   General: alert and oriented, no acute distress  Respiratory: normal respiratory effort  Abd: soft, non-tender, non-distended    Pelvic  Ext. Genitalia: normal external genitalia. Normal bartholin's and skeens glands.  L inguinal hernia noted.   Vagina: + atrophy. S/p colpocleisis.  No granulation tissue noted.  2 cm tracts noted bilaterally.  No discharge noted.   Non-tender bladder base without palpable mass.  Urethra: no masses or tenderness  Urethral meatus: no lesions, + caruncle        Impression  1. Frequent UTI  Urine culture    Urinalysis Microscopic    estradioL (ESTRACE) 0.01 % (0.1 mg/gram) vaginal cream      2. Vaginal atrophy            We reviewed the above issues and discussed options for short-term versus long-term management of her problems.   Plan:   No lifting > 50 pounds without assistance.  Use estrogen cream with your finger just inside the vagina twice weekly  Urine micro culture today   Start daily fiber.  Take 1 tsp of fiber powder (psyllium or other sugar-free powder).  Mix in 8 oz of water.  Take x 3-5 days.  Then, " increase fiber by 1 tsp every 3-5 days until stool is easy to pass.  Stop and continue at that dose.   Do not exceed 6 tsps/day.  May also use over the counter stool softener 1-2 x/day.  AVOID laxatives.  She will follow up with us in 6 months    I spent a total of 30 minutes on the day of the visit.  This includes face to face time and non-face to face time preparing to see the patient (eg, review of tests), obtaining and/or reviewing separately obtained history, documenting clinical information in the electronic or other health record, independently interpreting results and communicating results to the patient/family/caregiver, or care coordinator.     Madisyn Ramires NP  [facility]  Division of Female Pelvic Medicine and Reconstructive Surgery  Department of Obstetrics & Gynecology

## 2024-07-12 LAB — BACTERIA UR CULT: NO GROWTH

## 2024-07-30 ENCOUNTER — TELEPHONE (OUTPATIENT)
Dept: UROGYNECOLOGY | Facility: CLINIC | Age: 84
End: 2024-07-30
Payer: MEDICARE

## 2024-07-30 NOTE — TELEPHONE ENCOUNTER
Wili,  Pt stated she received a call from someone in urology Dr Thibodeaux's office requesting her to get a referral from JULIA Ramires for her UTI's. Pt stated she's confused because she thought she was seeing Madisyn for this issue. Please advise

## 2024-07-30 NOTE — TELEPHONE ENCOUNTER
"----- Message from Jose Roy sent at 7/30/2024  3:45 PM CDT -----  Consult/Advisory:    Name Of Caller: Self    Contact Preference?: [phone]     Provider Name: Keyla    What is the nature of the call?: Requesting referral to see Dr. Thibodeaux (Urology) for UTI    Additional Notes:  "Thank you for all that you do for our patients"  "

## 2024-08-01 ENCOUNTER — OFFICE VISIT (OUTPATIENT)
Dept: UROLOGY | Facility: CLINIC | Age: 84
End: 2024-08-01
Payer: MEDICARE

## 2024-08-01 VITALS
BODY MASS INDEX: 22.9 KG/M2 | WEIGHT: 125.25 LBS | DIASTOLIC BLOOD PRESSURE: 84 MMHG | HEART RATE: 88 BPM | SYSTOLIC BLOOD PRESSURE: 160 MMHG

## 2024-08-01 DIAGNOSIS — N39.0 RECURRENT UTI: Primary | ICD-10-CM

## 2024-08-01 DIAGNOSIS — N95.8 GENITOURINARY SYNDROME OF MENOPAUSE: ICD-10-CM

## 2024-08-01 PROCEDURE — 87088 URINE BACTERIA CULTURE: CPT | Performed by: UROLOGY

## 2024-08-01 PROCEDURE — 99999 PR PBB SHADOW E&M-EST. PATIENT-LVL III: CPT | Mod: PBBFAC,,, | Performed by: UROLOGY

## 2024-08-01 PROCEDURE — 87086 URINE CULTURE/COLONY COUNT: CPT | Performed by: UROLOGY

## 2024-08-01 PROCEDURE — 87186 SC STD MICRODIL/AGAR DIL: CPT | Performed by: UROLOGY

## 2024-08-01 RX ORDER — DOXYCYCLINE HYCLATE 100 MG
100 TABLET ORAL ONCE
OUTPATIENT
Start: 2024-08-01 | End: 2024-08-01

## 2024-08-01 RX ORDER — LIDOCAINE HYDROCHLORIDE 20 MG/ML
JELLY TOPICAL ONCE
OUTPATIENT
Start: 2024-08-01 | End: 2024-08-01

## 2024-08-01 NOTE — PROGRESS NOTES
CHIEF COMPLAINT:    Mrs. Felton is a 84 y.o. female presenting for a consultation at the request of Dr. Chaitanya Fernandez at . Patient presents with recurrent UTI.    PRESENTING ILLNESS:    Linda Felton is a 84 y.o. female who presents with a history of recurrent UTI.  She states she was recently seen by Madisyn Ramires for recurrent UTI.  She was prescribed Estrace cream but has not picked it up from the pharmacy.  She did not realize the impact it could have on her bladder infections. There are no cultures recently in our system, however, her primary is at .     Status post hysteroscopy, D&C, Le[place] colpocleisis, TOS, perineorrhaphy    Onset of symptoms:  2024.     Culture Proven (yes/no):  no    Symptoms:   dysuria, lower back pain    Risk factors:     Bowel movements:  diverticulosis, has diarrhea   Menopausal status:    Using Vaginal Estrogen:  Estrace hasn't picked it up yet    Reason why not using Vaginal Estrogen:   Sexually active:  not sexually active ( but not sexually active)   fluid intake:  32 oz fluids daily   Pad use:  none    , uterus in place, not sexually active, has diarrhea.       REVIEW OF SYSTEMS:    Review of Systems   Constitutional: Negative.    HENT: Negative.     Eyes: Negative.    Respiratory: Negative.     Cardiovascular: Negative.    Gastrointestinal: Negative.    Genitourinary:  Positive for urgency.   Musculoskeletal:  Positive for joint pain.   Skin: Negative.    Neurological: Negative.    Endo/Heme/Allergies: Negative.    Psychiatric/Behavioral: Negative.         PATIENT HISTORY:    Past Medical History:   Diagnosis Date    Arthritis     Diverticulitis     Hypertension        Past Surgical History:   Procedure Laterality Date    ABLATION COLPOCLESIS N/A 9/10/2018    Procedure: COLPOCLEISIS;  Surgeon: Felicia Gates MD;  Location: [facility] OR [address] [place] [address];  Service: OB/GYN;  Laterality: N/A;    CHOLECYSTECTOMY      COLONOSCOPY N/A 10/5/2015    Procedure:  COLONOSCOPY;  Surgeon: Perez White MD;  Location: [facility] ENDO (4TH FLR);  Service: Endoscopy;  Laterality: N/A;  C diff negative per Dr White/see microbiology report dated 8/26/15/svn    CYSTOSCOPY N/A 9/10/2018    Procedure: CYSTOSCOPY;  Surgeon: Felicia Gates MD;  Location: [facility] OR 2ND FLR;  Service: OB/GYN;  Laterality: N/A;    HERNIA REPAIR      HYSTEROSCOPY WITH DILATION AND CURETTAGE OF UTERUS  9/10/2018    Procedure: HYSTEROSCOPY, WITH DILATION AND CURETTAGE OF UTERUS;  Surgeon: Felicia Gates MD;  Location: [facility] OR 2ND FLR;  Service: OB/GYN;;    INSERTION OF MIDURETHRAL SLING N/A 9/10/2018    Procedure: SLING, MIDURETHRAL;  Surgeon: Felicia Gates MD;  [facility];  Service: OB/GYN;  Laterality: N/A;    kidney stones removed         Family History   Problem Relation Name Age of Onset    Hypertension Mother      Breast cancer Maternal Aunt      Colon cancer Sister  60        not colon cancer but unknown    Seizures Paternal Uncle       Social History     Socioeconomic History    Marital status:    Tobacco Use    Smoking status: Never     Passive exposure: Never    Smokeless tobacco: Never   Substance and Sexual Activity    Alcohol use: No    Drug use: No    Sexual activity: Never     Birth control/protection: Post-menopausal     Social Determinants of Health     Financial Resource Strain: Low Risk  (7/10/2024)    Overall Financial Resource Strain (CARDIA)     Difficulty of Paying Living Expenses: Not hard at all   Food Insecurity: No Food Insecurity (7/10/2024)    Hunger Vital Sign     Worried About Running Out of Food in the Last Year: Never true     Ran Out of Food in the Last Year: Never true   Physical Activity: Unknown (7/10/2024)    Exercise Vital Sign     Days of Exercise per Week: Patient declined     Minutes of Exercise per Session: 0 min   Stress: No Stress Concern Present (7/10/2024)    [employer] - Occupational Stress Questionnaire      Feeling of Stress : Not at all   Housing Stability: Unknown (7/10/2024)    Housing Stability Vital Sign     Unable to Pay for Housing in the Last Year: No       Allergies:  Ivp dye  [iodinated contrast media], Penicillins, Sulfa (sulfonamide antibiotics), and Adhesive    Medications:  Outpatient Encounter Medications as of 8/1/2024   Medication Sig Dispense Refill    amLODIPine (NORVASC) 5 MG tablet Take 5 mg by mouth once daily.      aspirin-sod bicarb-citric acid (CHANO-SELTZER) 324 mg TbEF Take 325 mg by mouth once daily. Patient takes 1/4 tablet for upset stomach      atenolol (TENORMIN) 50 MG tablet Take by mouth. 1 Tablet Oral Every day      estradioL (ESTRACE) 0.01 % (0.1 mg/gram) vaginal cream Place 1 g vaginally once daily. 42.5 g 3    hydrochlorothiazide (HYDRODIURIL) 25 MG tablet Take 25 mg by mouth once daily.  0    metFORMIN (GLUCOPHAGE) 500 MG tablet Take 500 mg by mouth 2 (two) times daily with meals.      metoprolol succinate (TOPROL-XL) 50 MG 24 hr tablet Take 50 mg by mouth once daily.      nitrofurantoin, macrocrystal-monohydrate, (MACROBID) 100 MG capsule Take 100 mg by mouth 2 (two) times daily.      pantoprazole (PROTONIX) 40 MG tablet        No facility-administered encounter medications on file as of 8/1/2024.         PHYSICAL EXAMINATION:    The patient generally appears in good health, is appropriately interactive, and is in no apparent distress.    Skin: No lesions.    Mental: Cooperative with normal affect.    Neuro: Grossly intact.    HEENT: Normal. No evidence of lymphadenopathy.    Chest:  normal inspiratory effort.    Abdomen: Soft, non-tender. No masses or organomegaly. Bladder is not palpable. No evidence of flank discomfort. No evidence of inguinal hernia.    Extremities: No clubbing, cyanosis, or edema    NOTE:  the exam was carried out with a nurse chaperone present  Normal external female genitalia  Grade II urogenital atrophy  Urethral meatus is normal  Well supported  anteriorly and posteriorly the vaginal length is about 1 1/2 cm.      LABS:    Lab Results   Component Value Date    BUN 21 10/10/2018    CREATININE 0.8 10/10/2018       UA 1.010, pH 8, tr blood, otherwise, negative otherwise, negative. Was negative on microscopy    IMPRESSION:    Recurrent UTI  Genitourinary syndrome of menopause (GSM)      PLAN:    1.  The catheterized specimen was sent for culture  2.  Handout provided explaining why estrogen topically is beneficial  3.  Cystoscopy and renal ultrasound.  IUGA handout on cystoscopy provided  4.  Encouraged her to  the Rx for Estrace    I spent 45 minutes with the patient of which more than half was spent in direct consultation with the patient in regards to our treatment and plan.      Copy to:  Dr. Bhargav Fernandez

## 2024-08-03 LAB — BACTERIA UR CULT: ABNORMAL

## 2024-08-06 ENCOUNTER — TELEPHONE (OUTPATIENT)
Dept: UROLOGY | Facility: CLINIC | Age: 84
End: 2024-08-06
Payer: MEDICARE

## 2024-08-06 DIAGNOSIS — N30.90 BLADDER INFECTION: Primary | ICD-10-CM

## 2024-08-06 RX ORDER — CEFDINIR 300 MG/1
300 CAPSULE ORAL 2 TIMES DAILY
Qty: 14 CAPSULE | Refills: 0 | Status: SHIPPED | OUTPATIENT
Start: 2024-08-06 | End: 2024-08-13

## 2024-08-07 ENCOUNTER — TELEPHONE (OUTPATIENT)
Dept: UROLOGY | Facility: CLINIC | Age: 84
End: 2024-08-07
Payer: MEDICARE

## 2024-08-07 ENCOUNTER — HOSPITAL ENCOUNTER (OUTPATIENT)
Dept: RADIOLOGY | Facility: HOSPITAL | Age: 84
Discharge: HOME | End: 2024-08-07
Attending: UROLOGY
Payer: MEDICARE

## 2024-08-07 DIAGNOSIS — N39.0 RECURRENT UTI: ICD-10-CM

## 2024-08-07 PROCEDURE — 76775 US EXAM ABDO BACK WALL LIM: CPT | Mod: 26,,, | Performed by: STUDENT IN AN ORGANIZED HEALTH CARE EDUCATION/TRAINING PROGRAM

## 2024-08-07 PROCEDURE — 76775 US EXAM ABDO BACK WALL LIM: CPT | Mod: TC

## 2024-08-14 ENCOUNTER — TELEPHONE (OUTPATIENT)
Dept: UROLOGY | Facility: CLINIC | Age: 84
End: 2024-08-14
Payer: MEDICARE

## 2024-08-14 NOTE — TELEPHONE ENCOUNTER
----- Message from Latosha Davies sent at 8/14/2024  4:15 PM CDT -----  Regarding: Procedure R/s  Patient called in regards to r/s procedure. Patient will take new appt date 8/26 at 1:15.    Please call back to further assist- [phone]

## 2024-08-14 NOTE — TELEPHONE ENCOUNTER
Pt r/s for cystoscopy on Mon, 8/26 at 1:15pm with Dr. Thibodeaux at the [facility]. Appt letter mailed for reminder.

## 2024-08-20 ENCOUNTER — TELEPHONE (OUTPATIENT)
Dept: UROLOGY | Facility: CLINIC | Age: 84
End: 2024-08-20
Payer: MEDICARE

## 2024-08-20 NOTE — TELEPHONE ENCOUNTER
----- Message from Merissa Hoffman sent at 8/20/2024  8:59 AM CDT -----  Regarding: question  Contact: [phone]  Pt called in regards to some question for a upcoming procedure  .....Please call and adv @ [phone] per the patient they have been trying to reach for awhile  Per the patient she wants to cancel her appointment and she has been admitted to the hospital that is why she was not able to reach

## 2024-10-30 ENCOUNTER — TELEPHONE (OUTPATIENT)
Dept: UROLOGY | Facility: CLINIC | Age: 84
End: 2024-10-30
Payer: MEDICARE

## 2024-10-31 ENCOUNTER — OFFICE VISIT (OUTPATIENT)
Dept: UROLOGY | Facility: CLINIC | Age: 84
End: 2024-10-31
Payer: MEDICARE

## 2024-10-31 VITALS
DIASTOLIC BLOOD PRESSURE: 82 MMHG | WEIGHT: 125.44 LBS | SYSTOLIC BLOOD PRESSURE: 155 MMHG | HEART RATE: 85 BPM | BODY MASS INDEX: 22.94 KG/M2

## 2024-10-31 DIAGNOSIS — R30.0 DYSURIA: ICD-10-CM

## 2024-10-31 DIAGNOSIS — N39.0 RECURRENT UTI: Primary | ICD-10-CM

## 2024-10-31 PROCEDURE — 1159F MED LIST DOCD IN RCRD: CPT | Mod: CPTII,S$GLB,,

## 2024-10-31 PROCEDURE — 3288F FALL RISK ASSESSMENT DOCD: CPT | Mod: CPTII,S$GLB,,

## 2024-10-31 PROCEDURE — 99999 PR PBB SHADOW E&M-EST. PATIENT-LVL II: CPT | Mod: PBBFAC,,,

## 2024-10-31 PROCEDURE — 3077F SYST BP >= 140 MM HG: CPT | Mod: CPTII,S$GLB,,

## 2024-10-31 PROCEDURE — 1101F PT FALLS ASSESS-DOCD LE1/YR: CPT | Mod: CPTII,S$GLB,,

## 2024-10-31 PROCEDURE — 87088 URINE BACTERIA CULTURE: CPT

## 2024-10-31 PROCEDURE — 99214 OFFICE O/P EST MOD 30 MIN: CPT | Mod: S$GLB,,,

## 2024-10-31 PROCEDURE — 1160F RVW MEDS BY RX/DR IN RCRD: CPT | Mod: CPTII,S$GLB,,

## 2024-10-31 PROCEDURE — 87086 URINE CULTURE/COLONY COUNT: CPT

## 2024-10-31 PROCEDURE — 3079F DIAST BP 80-89 MM HG: CPT | Mod: CPTII,S$GLB,,

## 2024-10-31 PROCEDURE — 87186 SC STD MICRODIL/AGAR DIL: CPT

## 2024-11-01 LAB — BACTERIA UR CULT: ABNORMAL

## 2024-11-04 DIAGNOSIS — N30.00 ACUTE CYSTITIS WITHOUT HEMATURIA: Primary | ICD-10-CM

## 2024-11-04 RX ORDER — NITROFURANTOIN 25; 75 MG/1; MG/1
100 CAPSULE ORAL 2 TIMES DAILY
Qty: 10 CAPSULE | Refills: 0 | Status: SHIPPED | OUTPATIENT
Start: 2024-11-04 | End: 2024-11-09

## 2024-11-15 ENCOUNTER — TELEPHONE (OUTPATIENT)
Dept: UROLOGY | Facility: CLINIC | Age: 84
End: 2024-11-15
Payer: MEDICARE

## 2024-11-21 ENCOUNTER — PROCEDURE VISIT (OUTPATIENT)
Facility: CLINIC | Age: 84
End: 2024-11-21
Payer: MEDICARE

## 2024-11-21 VITALS
DIASTOLIC BLOOD PRESSURE: 83 MMHG | TEMPERATURE: 98 F | RESPIRATION RATE: 20 BRPM | HEART RATE: 84 BPM | SYSTOLIC BLOOD PRESSURE: 177 MMHG | WEIGHT: 124.56 LBS | BODY MASS INDEX: 22.78 KG/M2

## 2024-11-21 DIAGNOSIS — N39.0 RECURRENT UTI: ICD-10-CM

## 2024-11-21 PROCEDURE — 52000 CYSTOURETHROSCOPY: CPT | Mod: S$GLB,,, | Performed by: UROLOGY

## 2024-11-21 RX ORDER — LIDOCAINE HYDROCHLORIDE 20 MG/ML
JELLY TOPICAL
Status: COMPLETED | OUTPATIENT
Start: 2024-11-21 | End: 2024-11-21

## 2024-11-21 RX ADMIN — LIDOCAINE HYDROCHLORIDE 5 ML: 20 JELLY TOPICAL at 09:11

## 2024-11-21 NOTE — PROCEDURES
Procedures    CYSTOSCOPY REPORT    Pre Procedure Diagnosis:  recurrent UTI    Post Procedure Diagnosis:  cystitis    Anesthesia: 10 cc 2% lidocaine jelly applied per urethra.    14 FR Flexible Olympus cystoscope used.    FINDINGS:  Dome, anterior, posterior, lateral walls and bladder base free of papillary abnormalities. Right and left ureteral orifices in the normal postion and configuration, both effluxed clear urine.  Bladder neck and urethra were normal. Round nodules reflecting recent UTI found throughout the bladder    Specimen:  none    The patient was taken to the cystoscopy suite and placed in dorsal lithotomy position.  The genitalia was prepped and draped  in the usual sterile fashion.  Time out was performed.  Two percent lidocaine jelly was inserted in the urethra.  After sufficent time had passed to allow good local anesthesia, the cystoscope was inserted in the urethra and passed into the bladder visualizing the urethra along its entire course.  The dome, anterior, posterior and lateral walls were examined systematically.  The ureteral orifices were in their usual position and configuration.  The cystoscope was turned upon itself 180 degrees to visualize the bladder neck.  The cystoscope was then brought to the level of the bladder neck, the water was turned on and the urethra was visualized.  The cystoscope was removed and the patient was instructed to urinate prior to leaving the office.     Post procedure medication:  doxycycline 100 mg x 1    ADDITIONAL NOTES:  renal ultrasound on 8/7/2024 showed the following:  FINDINGS:  Right kidney: The right kidney measures 11.0 cm. Increased echogenicity with cortical thinning. Mild loss of corticomedullary distinction. Resistive index measures 0.66.  No mass. No renal stone. No hydronephrosis.     Left kidney: The left kidney measures 8.3 cm. Increased echogenicity with cortical thinning. Mild loss of corticomedullary distinction. Resistive index unable to  be obtained.  No mass. No renal stone. No hydronephrosis.  Simple renal cyst measuring 1.7 x 1.3 x 1.6 cm per    ASSESSMENT/PLAN:  84 year old woman status post flexible cystoscopy.  1. Push fluids for 24 hours.  2. May see blood in the urine, this should gradually improve over the next 2-3 days.  3. The patient was instructed to return to the office or go to the emergency should fever, chills, cloudy urine, or inability to urinate develop.  4.  She brought in AZO D mannose it has 1 gram in 2 capsules.  Recommended that she take 2 capsules twice a day.   5.  Follow up as needed.    You have been diagnosed with simple bladder infections.  As such, the following is recommended:    Use the treatment for genitourinary syndrome of menopause (vaginal estrogen product or prasterone)  2.  Keep bowel movements soft and regular  3.  Drink 1500 ml water daily  4.  If you have an acute urinary tract infection we recommend going to Urgent Care

## 2024-11-21 NOTE — PATIENT INSTRUCTIONS
What to Expect After a Cystoscopy  For the next 24-48 hours, you may feel a mild burning when you urinate. This burning is normal and expected. Drink plenty of water to dilute the urine to help relieve the burning sensation. You may also see a small amount of blood in your urine after the procedure.    Unless you are already taking antibiotics, you may be given an antibiotic after the test to prevent infection.    Signs and Symptoms to Report  Call the [facility] at [phone] if you develop any of the following:  Fever of 101 degrees or higher  Chills or persistent bleeding  Inability to urinate

## 2025-02-27 NOTE — TELEPHONE ENCOUNTER
Pt stated she spoke to someone and was rescheduled to a later time on the same day.   Principal Discharge DX:	Cellulitis  Secondary Diagnosis:	Papule of skin   1

## 2025-06-10 ENCOUNTER — TELEPHONE (OUTPATIENT)
Dept: UROLOGY | Facility: CLINIC | Age: 85
End: 2025-06-10
Payer: MEDICARE

## 2025-06-10 NOTE — TELEPHONE ENCOUNTER
Copied from CRM #[id number]. Topic: Appointments - Same Day Access  >> Pablo 10, 2025  8:46 AM Xiomara wrote:  Scheduling Request           Appt Type:  ep     Date/Time Preference:Asap     Caller Name:pt     Contact Preference:[phone]      Comment: pt states she believes she has an uti and would like to be seen this week by provider. First available in epic is 9/2. Please call to advise thank you

## (undated) DEVICE — SOL IRR WATER STRL 3000 ML

## (undated) DEVICE — CLIPPER BLADE MOD 4406 (CAREF)

## (undated) DEVICE — SUT PDS II 2-0 CT-2 VIL

## (undated) DEVICE — SEE MEDLINE ITEM 146313

## (undated) DEVICE — SEE MEDLINE ITEM 157181

## (undated) DEVICE — TRAY MINOR GEN SURG

## (undated) DEVICE — SET IRR URLGY 2LINE UNIV SPIKE

## (undated) DEVICE — TRAY FOLEY 16FR INFECTION CONT

## (undated) DEVICE — POWDER ARISTA AH 3G

## (undated) DEVICE — SYS CLSR DERMABOND PRINEO 22CM

## (undated) DEVICE — SUT 2-0 VICRYL / SH (J417)

## (undated) DEVICE — SEE MEDLINE ITEM 154981

## (undated) DEVICE — ELECTRODE REM PLYHSV RETURN 9

## (undated) DEVICE — SPONGE DERMACEA GAUZE 4X4

## (undated) DEVICE — DRAPE STERI INSTRUMENT 1018

## (undated) DEVICE — SUT CTD VICRYL VIL BR SH 27

## (undated) DEVICE — BANDAGE KERLIX P/P 2.25IN STER

## (undated) DEVICE — SUT CTD VICRYL 0 VIL BR/CT

## (undated) DEVICE — SUT MONOCRYL 4-0 PS-2

## (undated) DEVICE — SYR 10CC LUER LOCK

## (undated) DEVICE — LUBRICANT SURGILUBE 2 OZ

## (undated) DEVICE — PACK LAPSCP/PELVSCPY III TIBRN